# Patient Record
Sex: FEMALE | Race: WHITE
[De-identification: names, ages, dates, MRNs, and addresses within clinical notes are randomized per-mention and may not be internally consistent; named-entity substitution may affect disease eponyms.]

---

## 2018-10-25 ENCOUNTER — TRANSCRIPTION ENCOUNTER (OUTPATIENT)
Age: 43
End: 2018-10-25

## 2019-09-04 ENCOUNTER — TRANSCRIPTION ENCOUNTER (OUTPATIENT)
Age: 44
End: 2019-09-04

## 2019-09-13 ENCOUNTER — TRANSCRIPTION ENCOUNTER (OUTPATIENT)
Age: 44
End: 2019-09-13

## 2021-06-18 ENCOUNTER — TRANSCRIPTION ENCOUNTER (OUTPATIENT)
Age: 46
End: 2021-06-18

## 2023-02-14 ENCOUNTER — NON-APPOINTMENT (OUTPATIENT)
Age: 48
End: 2023-02-14

## 2023-10-30 ENCOUNTER — NON-APPOINTMENT (OUTPATIENT)
Age: 48
End: 2023-10-30

## 2023-12-25 ENCOUNTER — NON-APPOINTMENT (OUTPATIENT)
Age: 48
End: 2023-12-25

## 2024-02-07 ENCOUNTER — NON-APPOINTMENT (OUTPATIENT)
Age: 49
End: 2024-02-07

## 2024-04-20 ENCOUNTER — NON-APPOINTMENT (OUTPATIENT)
Age: 49
End: 2024-04-20

## 2024-05-15 ENCOUNTER — NON-APPOINTMENT (OUTPATIENT)
Age: 49
End: 2024-05-15

## 2024-05-16 PROBLEM — Z00.00 ENCOUNTER FOR PREVENTIVE HEALTH EXAMINATION: Status: ACTIVE | Noted: 2024-05-16

## 2024-05-20 ENCOUNTER — NON-APPOINTMENT (OUTPATIENT)
Age: 49
End: 2024-05-20

## 2024-05-20 ENCOUNTER — APPOINTMENT (OUTPATIENT)
Dept: ORTHOPEDIC SURGERY | Facility: CLINIC | Age: 49
End: 2024-05-20
Payer: COMMERCIAL

## 2024-05-20 VITALS — HEIGHT: 60 IN | BODY MASS INDEX: 28.47 KG/M2 | WEIGHT: 145 LBS

## 2024-05-20 DIAGNOSIS — Z78.9 OTHER SPECIFIED HEALTH STATUS: ICD-10-CM

## 2024-05-20 DIAGNOSIS — E78.00 PURE HYPERCHOLESTEROLEMIA, UNSPECIFIED: ICD-10-CM

## 2024-05-20 PROCEDURE — 29130 APPL FINGER SPLINT STATIC: CPT | Mod: RT

## 2024-05-20 PROCEDURE — 99203 OFFICE O/P NEW LOW 30 MIN: CPT | Mod: 25

## 2024-05-20 RX ORDER — ROSUVASTATIN CALCIUM 5 MG/1
5 TABLET, FILM COATED ORAL
Refills: 0 | Status: ACTIVE | COMMUNITY

## 2024-05-20 RX ORDER — METHYLPREDNISOLONE 4 MG/1
4 TABLET ORAL
Qty: 1 | Refills: 0 | Status: ACTIVE | COMMUNITY
Start: 2024-05-20 | End: 1900-01-01

## 2024-05-20 NOTE — HISTORY OF PRESENT ILLNESS
[Dull/Aching] : dull/aching [Localized] : localized [Sharp] : sharp [Intermittent] : intermittent [Nothing helps with pain getting better] : Nothing helps with pain getting better [Exercising] : exercising [Full time] : Work status: full time [de-identified] : 05/20/2024 :GERALD ALEJO , a 48 year old female, presents today for right thumb pt is unsure of direct trauma.  PMH: hyperlipidemia Allergies: PCN, contrast dye [] : Patient is currently injured and not playing sports: no

## 2024-05-20 NOTE — ASSESSMENT
[FreeTextEntry1] : The patient was advised of the diagnosis. The natural history of the pathology was explained in full to the patient in layman's terms. All questions were answered. The risks and benefits of surgical and non-surgical treatment alternatives were explained in full to the patient.  Pt provided right thumb STACK splint for comfort. MDP provided. RTO in 3-4 weeks for fu care.

## 2024-05-20 NOTE — IMAGING
[de-identified] : right thumb IP joint swelling and ttp  no ligamentous laxity is noted  mild right thumb stiffness all digits are nvi with intact flexor and extensor function nml cascade  Outside 3 view xrays shows Ip joint OA as well as calcific tendinitis.

## 2024-05-30 ENCOUNTER — NON-APPOINTMENT (OUTPATIENT)
Age: 49
End: 2024-05-30

## 2024-06-03 ENCOUNTER — NON-APPOINTMENT (OUTPATIENT)
Age: 49
End: 2024-06-03

## 2024-06-03 ENCOUNTER — APPOINTMENT (OUTPATIENT)
Dept: ORTHOPEDIC SURGERY | Facility: CLINIC | Age: 49
End: 2024-06-03
Payer: COMMERCIAL

## 2024-06-03 DIAGNOSIS — M65.241 CALCIFIC TENDINITIS, RIGHT HAND: ICD-10-CM

## 2024-06-03 PROCEDURE — 99213 OFFICE O/P EST LOW 20 MIN: CPT

## 2024-06-03 NOTE — HISTORY OF PRESENT ILLNESS
[Dull/Aching] : dull/aching [Localized] : localized [Sharp] : sharp [Intermittent] : intermittent [Nothing helps with pain getting better] : Nothing helps with pain getting better [Exercising] : exercising [Full time] : Work status: full time [de-identified] : 06/03/2024: Patient follows up for right thumb pain. She didn't finish MDP due to adverse reaction but has been using splint, and taking motrin TID, reports no improvement in symptoms.   05/20/2024 :GERALD ALEJO , a 48 year old female, presents today for right thumb pt is unsure of direct trauma.  PMH: hyperlipidemia Allergies: PCN, contrast dye [] : Patient is currently injured and not playing sports: no

## 2024-06-03 NOTE — IMAGING
[de-identified] : right thumb IP joint swelling and ttp  no ligamentous laxity is noted  mild right thumb stiffness all digits are nvi with intact flexor and extensor function nml cascade  Outside 3 view xrays shows Ip joint OA as well as calcific tendinitis.

## 2024-06-03 NOTE — ASSESSMENT
[FreeTextEntry1] : The patient was advised of the diagnosis. The natural history of the pathology was explained in full to the patient in layman's terms. All questions were answered. The risks and benefits of surgical and non-surgical treatment alternatives were explained in full to the patient.  Pt declines CSI at this time Naproxen PRN Pt provided right thumb STACK splint for comfort. RTO in 3-4 weeks for fu care.

## 2024-06-05 RX ORDER — IBUPROFEN 600 MG/1
600 TABLET, FILM COATED ORAL
Qty: 40 | Refills: 3 | Status: DISCONTINUED | COMMUNITY
Start: 2024-06-05 | End: 2024-06-05

## 2024-06-05 RX ORDER — NAPROXEN SODIUM 550 MG/1
550 TABLET ORAL
Qty: 60 | Refills: 2 | Status: DISCONTINUED | COMMUNITY
Start: 2024-06-03 | End: 2024-06-05

## 2024-06-05 RX ORDER — IBUPROFEN 800 MG/1
800 TABLET, FILM COATED ORAL
Qty: 40 | Refills: 3 | Status: ACTIVE | COMMUNITY
Start: 2024-06-05 | End: 1900-01-01

## 2024-06-18 ENCOUNTER — APPOINTMENT (OUTPATIENT)
Dept: ORTHOPEDIC SURGERY | Facility: CLINIC | Age: 49
End: 2024-06-18

## 2024-06-21 ENCOUNTER — NON-APPOINTMENT (OUTPATIENT)
Age: 49
End: 2024-06-21

## 2024-09-25 ENCOUNTER — NON-APPOINTMENT (OUTPATIENT)
Age: 49
End: 2024-09-25

## 2025-04-08 ENCOUNTER — APPOINTMENT (OUTPATIENT)
Facility: CLINIC | Age: 50
End: 2025-04-08
Payer: COMMERCIAL

## 2025-04-08 VITALS
HEIGHT: 60 IN | HEART RATE: 86 BPM | WEIGHT: 155 LBS | RESPIRATION RATE: 16 BRPM | SYSTOLIC BLOOD PRESSURE: 137 MMHG | OXYGEN SATURATION: 98 % | BODY MASS INDEX: 30.43 KG/M2 | DIASTOLIC BLOOD PRESSURE: 90 MMHG

## 2025-04-08 DIAGNOSIS — K86.2 CYST OF PANCREAS: ICD-10-CM

## 2025-04-08 DIAGNOSIS — K86.89 OTHER SPECIFIED DISEASES OF PANCREAS: ICD-10-CM

## 2025-04-08 DIAGNOSIS — K80.50 CALCULUS OF BILE DUCT W/OUT CHOLANGITIS OR CHOLECYSTITIS W/OUT OBSTRUCTION: ICD-10-CM

## 2025-04-08 DIAGNOSIS — Z86.39 PERSONAL HISTORY OF OTHER ENDOCRINE, NUTRITIONAL AND METABOLIC DISEASE: ICD-10-CM

## 2025-04-08 DIAGNOSIS — K80.20 CALCULUS OF GALLBLADDER W/OUT CHOLECYSTITIS W/OUT OBSTRUCTION: ICD-10-CM

## 2025-04-08 DIAGNOSIS — Z86.79 PERSONAL HISTORY OF OTHER DISEASES OF THE CIRCULATORY SYSTEM: ICD-10-CM

## 2025-04-08 PROCEDURE — 99204 OFFICE O/P NEW MOD 45 MIN: CPT

## 2025-04-09 ENCOUNTER — NON-APPOINTMENT (OUTPATIENT)
Age: 50
End: 2025-04-09

## 2025-04-22 RX ORDER — ROSUVASTATIN CALCIUM 5 MG/1
1 TABLET, FILM COATED ORAL
Refills: 0 | DISCHARGE

## 2025-04-22 NOTE — ASU PATIENT PROFILE, ADULT - ACCEPTABLE
ABSOLUTELY NO BENDING, STRENUOUS ACTIVITIES, NO RUBBING EYE, LIFTING ANYTHING OVER 10 LBS.    START EYE DROPS WHEN YOU GET HOME TODAY (12NOON, 5PM AND 9PM)    TOMORROW  START TAKING THE DROPS 4 TIMES A DAY (BREAKFAST, LUNCH, DINNER AND BEDTIME)    YOU HAVE 3 EYE DROPS: (BEIGE TOP IS THE ANTIBIOTIC, PINK TOP IS STEROID, GRAY TOP IS USED FOR PAIN)    SHAKE ALL OF THE DROPS PRIOR TO USE..WASH HANDS AND WAIT AT LEAST 2 MINUTES BETWEEN EACH DROP.    WEAR EYE SHIELD AT BEDTIME, WEAR DARK GLASSES WHILE OUT IN WEATHER AND IN BRIGHT LIGHTS.     SLEEP ON OPPOSITE SIDE.     TAKE EYE BAG WITH YOU TO MD APPT IN THE AM    2

## 2025-04-22 NOTE — ASU PATIENT PROFILE, ADULT - NSICDXPASTMEDICALHX_GEN_ALL_CORE_FT
PAST MEDICAL HISTORY:  CAD (coronary artery disease)     History of Hashimoto thyroiditis     HLD (hyperlipidemia)     HTN (hypertension)     
                                  Heavy/irregular periods           HYST                                      Vaginal discharge                   no  Hematological: Bruises easy Denies clotting disorder  yes     Endocrine:  Hot flashes Multiple times per day yes     Hot/Cold Intolerance  no    Psychological:            Mood and affect were within normal limits.        yes                 Physical Exam    Physical Exam:    Vitals:    03/05/25 0959   BP: 120/78   Pulse: 95   Weight: 57.2 kg (126 lb)   Height: 1.549 m (5' 1\")       General Appearance:  This  is a well developed, well nourished, well groomed female.      Her BMI was reviewed. Nutritional decision making andexercise were discussed.    Neurological:  The patient is alert and oriented to time,place, person, and situation    Skin:  A brief inspection of the skin revealed no rashes or lesions.     Neck:  The neck was supple.     Respiratory:  There was unlabored respiratory effort. Lungs clear to ascultation.    Cardiovascular:  The patients extremities were without calf tenderness or edema.Heart with a regular rate and rhythm.    Abdomen:  The abdomen was soft and non-tender with no guarding, rebound or rigidity.  No hernias were appreciated.     Breast:   The patients breasts were symmetrical.  There were no masses, discharge or retractions noted.  Self breast exams were reviewed.    Pelvic Exam:  The external genitalia was with a normal appearance. Hair distribution is sparse.          Urinary System: Urethral meatus normal    The vaginal vault is pale and atrophicl. There were no cystocele, rectocele, or enterocele appreciated. There was yellow to brown vaginal discharge.    The cervix was without lesions. There was no cervical motion tenderness.    The uterus was mobile, midline and regular.    The adnexa no fullness, tenderness or masses appreciated.    Rectal exam: deferred per patient request          ASSESSMENT: Normal annual well woman exam    57

## 2025-04-22 NOTE — ASU PATIENT PROFILE, ADULT - FALL HARM RISK - UNIVERSAL INTERVENTIONS
Bed in lowest position, wheels locked, appropriate side rails in place/Call bell, personal items and telephone in reach/Instruct patient to call for assistance before getting out of bed or chair/Non-slip footwear when patient is out of bed/[place] to call system/Physically safe environment - no spills, clutter or unnecessary equipment/Purposeful Proactive Rounding/Room/bathroom lighting operational, light cord in reach

## 2025-04-23 ENCOUNTER — OUTPATIENT (OUTPATIENT)
Dept: INPATIENT UNIT | Facility: HOSPITAL | Age: 50
LOS: 1 days | Discharge: HOME | End: 2025-04-23
Payer: COMMERCIAL

## 2025-04-23 ENCOUNTER — INPATIENT (INPATIENT)
Facility: HOSPITAL | Age: 50
LOS: 4 days | Discharge: HOME | DRG: 393 | End: 2025-04-28
Attending: STUDENT IN AN ORGANIZED HEALTH CARE EDUCATION/TRAINING PROGRAM | Admitting: STUDENT IN AN ORGANIZED HEALTH CARE EDUCATION/TRAINING PROGRAM
Payer: COMMERCIAL

## 2025-04-23 ENCOUNTER — APPOINTMENT (OUTPATIENT)
Dept: GASTROENTEROLOGY | Facility: HOSPITAL | Age: 50
End: 2025-04-23

## 2025-04-23 ENCOUNTER — RESULT REVIEW (OUTPATIENT)
Age: 50
End: 2025-04-23

## 2025-04-23 ENCOUNTER — TRANSCRIPTION ENCOUNTER (OUTPATIENT)
Age: 50
End: 2025-04-23

## 2025-04-23 VITALS — WEIGHT: 154.98 LBS | HEART RATE: 80 BPM | RESPIRATION RATE: 16 BRPM | OXYGEN SATURATION: 99 % | TEMPERATURE: 97 F

## 2025-04-23 VITALS
OXYGEN SATURATION: 98 % | HEART RATE: 53 BPM | TEMPERATURE: 98 F | DIASTOLIC BLOOD PRESSURE: 84 MMHG | SYSTOLIC BLOOD PRESSURE: 131 MMHG | RESPIRATION RATE: 14 BRPM

## 2025-04-23 VITALS — HEIGHT: 60 IN | WEIGHT: 155.21 LBS

## 2025-04-23 DIAGNOSIS — K86.2 CYST OF PANCREAS: ICD-10-CM

## 2025-04-23 DIAGNOSIS — I25.10 ATHEROSCLEROTIC HEART DISEASE OF NATIVE CORONARY ARTERY WITHOUT ANGINA PECTORIS: ICD-10-CM

## 2025-04-23 DIAGNOSIS — K91.89 OTHER POSTPROCEDURAL COMPLICATIONS AND DISORDERS OF DIGESTIVE SYSTEM: ICD-10-CM

## 2025-04-23 DIAGNOSIS — E87.6 HYPOKALEMIA: ICD-10-CM

## 2025-04-23 DIAGNOSIS — E06.3 AUTOIMMUNE THYROIDITIS: ICD-10-CM

## 2025-04-23 DIAGNOSIS — E78.5 HYPERLIPIDEMIA, UNSPECIFIED: ICD-10-CM

## 2025-04-23 DIAGNOSIS — K76.89 OTHER SPECIFIED DISEASES OF LIVER: ICD-10-CM

## 2025-04-23 DIAGNOSIS — N17.9 ACUTE KIDNEY FAILURE, UNSPECIFIED: ICD-10-CM

## 2025-04-23 DIAGNOSIS — K85.80 OTHER ACUTE PANCREATITIS WITHOUT NECROSIS OR INFECTION: ICD-10-CM

## 2025-04-23 DIAGNOSIS — E78.00 PURE HYPERCHOLESTEROLEMIA, UNSPECIFIED: ICD-10-CM

## 2025-04-23 DIAGNOSIS — Z98.890 OTHER SPECIFIED POSTPROCEDURAL STATES: Chronic | ICD-10-CM

## 2025-04-23 DIAGNOSIS — K80.70 CALCULUS OF GALLBLADDER AND BILE DUCT WITHOUT CHOLECYSTITIS WITHOUT OBSTRUCTION: ICD-10-CM

## 2025-04-23 DIAGNOSIS — Z98.891 HISTORY OF UTERINE SCAR FROM PREVIOUS SURGERY: Chronic | ICD-10-CM

## 2025-04-23 DIAGNOSIS — K76.0 FATTY (CHANGE OF) LIVER, NOT ELSEWHERE CLASSIFIED: ICD-10-CM

## 2025-04-23 DIAGNOSIS — K66.1 HEMOPERITONEUM: ICD-10-CM

## 2025-04-23 DIAGNOSIS — E83.39 OTHER DISORDERS OF PHOSPHORUS METABOLISM: ICD-10-CM

## 2025-04-23 DIAGNOSIS — E03.9 HYPOTHYROIDISM, UNSPECIFIED: ICD-10-CM

## 2025-04-23 DIAGNOSIS — Z91.041 RADIOGRAPHIC DYE ALLERGY STATUS: ICD-10-CM

## 2025-04-23 DIAGNOSIS — Z88.2 ALLERGY STATUS TO SULFONAMIDES: ICD-10-CM

## 2025-04-23 DIAGNOSIS — K22.89 OTHER SPECIFIED DISEASE OF ESOPHAGUS: ICD-10-CM

## 2025-04-23 DIAGNOSIS — T37.3X5A ADVERSE EFFECT OF OTHER ANTIPROTOZOAL DRUGS, INITIAL ENCOUNTER: ICD-10-CM

## 2025-04-23 DIAGNOSIS — R10.11 RIGHT UPPER QUADRANT PAIN: ICD-10-CM

## 2025-04-23 DIAGNOSIS — A41.9 SEPSIS, UNSPECIFIED ORGANISM: ICD-10-CM

## 2025-04-23 DIAGNOSIS — K91.840 POSTPROCEDURAL HEMORRHAGE OF A DIGESTIVE SYSTEM ORGAN OR STRUCTURE FOLLOWING A DIGESTIVE SYSTEM PROCEDURE: ICD-10-CM

## 2025-04-23 DIAGNOSIS — Z88.0 ALLERGY STATUS TO PENICILLIN: ICD-10-CM

## 2025-04-23 DIAGNOSIS — I10 ESSENTIAL (PRIMARY) HYPERTENSION: ICD-10-CM

## 2025-04-23 DIAGNOSIS — Z91.018 ALLERGY TO OTHER FOODS: ICD-10-CM

## 2025-04-23 DIAGNOSIS — T36.8X5A ADVERSE EFFECT OF OTHER SYSTEMIC ANTIBIOTICS, INITIAL ENCOUNTER: ICD-10-CM

## 2025-04-23 DIAGNOSIS — Z79.890 HORMONE REPLACEMENT THERAPY: ICD-10-CM

## 2025-04-23 DIAGNOSIS — K86.89 OTHER SPECIFIED DISEASES OF PANCREAS: ICD-10-CM

## 2025-04-23 DIAGNOSIS — L27.0 GENERALIZED SKIN ERUPTION DUE TO DRUGS AND MEDICAMENTS TAKEN INTERNALLY: ICD-10-CM

## 2025-04-23 DIAGNOSIS — K31.A19 GASTRIC INTESTINAL METAPLASIA WITHOUT DYSPLASIA, UNSPECIFIED SITE: ICD-10-CM

## 2025-04-23 DIAGNOSIS — K80.50 CALCULUS OF BILE DUCT WITHOUT CHOLANGITIS OR CHOLECYSTITIS WITHOUT OBSTRUCTION: ICD-10-CM

## 2025-04-23 DIAGNOSIS — K20.90 ESOPHAGITIS, UNSPECIFIED WITHOUT BLEEDING: ICD-10-CM

## 2025-04-23 DIAGNOSIS — K59.00 CONSTIPATION, UNSPECIFIED: ICD-10-CM

## 2025-04-23 LAB
ALBUMIN SERPL ELPH-MCNC: 3.7 G/DL — SIGNIFICANT CHANGE UP (ref 3.3–5)
ALP SERPL-CCNC: 93 U/L — SIGNIFICANT CHANGE UP (ref 40–120)
ALT FLD-CCNC: 60 U/L — SIGNIFICANT CHANGE UP (ref 12–78)
ANION GAP SERPL CALC-SCNC: 8 MMOL/L — SIGNIFICANT CHANGE UP (ref 5–17)
APTT BLD: 27.2 SEC — SIGNIFICANT CHANGE UP (ref 26.1–36.8)
AST SERPL-CCNC: 62 U/L — HIGH (ref 15–37)
BASOPHILS # BLD AUTO: 0.03 K/UL — SIGNIFICANT CHANGE UP (ref 0–0.2)
BASOPHILS NFR BLD AUTO: 0.2 % — SIGNIFICANT CHANGE UP (ref 0–2)
BILIRUB SERPL-MCNC: 1.1 MG/DL — SIGNIFICANT CHANGE UP (ref 0.2–1.2)
BLD GP AB SCN SERPL QL: SIGNIFICANT CHANGE UP
BUN SERPL-MCNC: 9 MG/DL — SIGNIFICANT CHANGE UP (ref 7–23)
CALCIUM SERPL-MCNC: 9.5 MG/DL — SIGNIFICANT CHANGE UP (ref 8.5–10.1)
CHLORIDE SERPL-SCNC: 110 MMOL/L — HIGH (ref 96–108)
CO2 SERPL-SCNC: 23 MMOL/L — SIGNIFICANT CHANGE UP (ref 22–31)
CREAT SERPL-MCNC: 0.88 MG/DL — SIGNIFICANT CHANGE UP (ref 0.5–1.3)
EGFR: 80 ML/MIN/1.73M2 — SIGNIFICANT CHANGE UP
EGFR: 80 ML/MIN/1.73M2 — SIGNIFICANT CHANGE UP
EOSINOPHIL # BLD AUTO: 0 K/UL — SIGNIFICANT CHANGE UP (ref 0–0.5)
EOSINOPHIL NFR BLD AUTO: 0 % — SIGNIFICANT CHANGE UP (ref 0–6)
GLUCOSE SERPL-MCNC: 146 MG/DL — HIGH (ref 70–99)
HCT VFR BLD CALC: 45.8 % — HIGH (ref 34.5–45)
HGB BLD-MCNC: 15.3 G/DL — SIGNIFICANT CHANGE UP (ref 11.5–15.5)
IMM GRANULOCYTES # BLD AUTO: 0.04 K/UL — SIGNIFICANT CHANGE UP (ref 0–0.07)
IMM GRANULOCYTES NFR BLD AUTO: 0.3 % — SIGNIFICANT CHANGE UP (ref 0–0.9)
INR BLD: 1.04 RATIO — SIGNIFICANT CHANGE UP (ref 0.85–1.16)
LIDOCAIN IGE QN: 3225 U/L — HIGH (ref 13–75)
LYMPHOCYTES # BLD AUTO: 1.22 K/UL — SIGNIFICANT CHANGE UP (ref 1–3.3)
LYMPHOCYTES NFR BLD AUTO: 8.4 % — LOW (ref 13–44)
MCHC RBC-ENTMCNC: 30.2 PG — SIGNIFICANT CHANGE UP (ref 27–34)
MCHC RBC-ENTMCNC: 33.4 G/DL — SIGNIFICANT CHANGE UP (ref 32–36)
MCV RBC AUTO: 90.5 FL — SIGNIFICANT CHANGE UP (ref 80–100)
MONOCYTES # BLD AUTO: 0.48 K/UL — SIGNIFICANT CHANGE UP (ref 0–0.9)
MONOCYTES NFR BLD AUTO: 3.3 % — SIGNIFICANT CHANGE UP (ref 2–14)
NEUTROPHILS # BLD AUTO: 12.78 K/UL — HIGH (ref 1.8–7.4)
NEUTROPHILS NFR BLD AUTO: 87.8 % — HIGH (ref 43–77)
NRBC # BLD AUTO: 0 K/UL — SIGNIFICANT CHANGE UP (ref 0–0)
NRBC # FLD: 0 K/UL — SIGNIFICANT CHANGE UP (ref 0–0)
NRBC BLD AUTO-RTO: 0 /100 WBCS — SIGNIFICANT CHANGE UP (ref 0–0)
PLATELET # BLD AUTO: 264 K/UL — SIGNIFICANT CHANGE UP (ref 150–400)
PMV BLD: 11 FL — SIGNIFICANT CHANGE UP (ref 7–13)
POTASSIUM SERPL-MCNC: 3.5 MMOL/L — SIGNIFICANT CHANGE UP (ref 3.5–5.3)
POTASSIUM SERPL-SCNC: 3.5 MMOL/L — SIGNIFICANT CHANGE UP (ref 3.5–5.3)
PROT SERPL-MCNC: 7.6 GM/DL — SIGNIFICANT CHANGE UP (ref 6–8.3)
PROTHROM AB SERPL-ACNC: 12.3 SEC — SIGNIFICANT CHANGE UP (ref 9.9–13.4)
RBC # BLD: 5.06 M/UL — SIGNIFICANT CHANGE UP (ref 3.8–5.2)
RBC # FLD: 12 % — SIGNIFICANT CHANGE UP (ref 10.3–14.5)
SODIUM SERPL-SCNC: 141 MMOL/L — SIGNIFICANT CHANGE UP (ref 135–145)
WBC # BLD: 14.55 K/UL — HIGH (ref 3.8–10.5)
WBC # FLD AUTO: 14.55 K/UL — HIGH (ref 3.8–10.5)

## 2025-04-23 PROCEDURE — 88305 TISSUE EXAM BY PATHOLOGIST: CPT | Mod: 26

## 2025-04-23 PROCEDURE — 88313 SPECIAL STAINS GROUP 2: CPT

## 2025-04-23 PROCEDURE — 88360 TUMOR IMMUNOHISTOCHEM/MANUAL: CPT

## 2025-04-23 PROCEDURE — 93010 ELECTROCARDIOGRAM REPORT: CPT

## 2025-04-23 PROCEDURE — 88312 SPECIAL STAINS GROUP 1: CPT | Mod: 26

## 2025-04-23 PROCEDURE — 88341 IMHCHEM/IMCYTCHM EA ADD ANTB: CPT

## 2025-04-23 PROCEDURE — 88313 SPECIAL STAINS GROUP 2: CPT | Mod: 26

## 2025-04-23 PROCEDURE — 43238 EGD US FINE NEEDLE BX/ASPIR: CPT

## 2025-04-23 PROCEDURE — 93005 ELECTROCARDIOGRAM TRACING: CPT

## 2025-04-23 PROCEDURE — 88341 IMHCHEM/IMCYTCHM EA ADD ANTB: CPT | Mod: 26

## 2025-04-23 PROCEDURE — 88312 SPECIAL STAINS GROUP 1: CPT

## 2025-04-23 PROCEDURE — 88342 IMHCHEM/IMCYTCHM 1ST ANTB: CPT

## 2025-04-23 PROCEDURE — 88307 TISSUE EXAM BY PATHOLOGIST: CPT | Mod: 26

## 2025-04-23 PROCEDURE — 99285 EMERGENCY DEPT VISIT HI MDM: CPT

## 2025-04-23 PROCEDURE — 88305 TISSUE EXAM BY PATHOLOGIST: CPT

## 2025-04-23 PROCEDURE — 74176 CT ABD & PELVIS W/O CONTRAST: CPT | Mod: 26

## 2025-04-23 PROCEDURE — 88307 TISSUE EXAM BY PATHOLOGIST: CPT

## 2025-04-23 PROCEDURE — 88342 IMHCHEM/IMCYTCHM 1ST ANTB: CPT | Mod: 26

## 2025-04-23 PROCEDURE — 43239 EGD BIOPSY SINGLE/MULTIPLE: CPT | Mod: 59

## 2025-04-23 RX ORDER — FENTANYL CITRATE-0.9 % NACL/PF 100MCG/2ML
25 SYRINGE (ML) INTRAVENOUS
Refills: 0 | Status: DISCONTINUED | OUTPATIENT
Start: 2025-04-23 | End: 2025-04-23

## 2025-04-23 RX ORDER — KETOROLAC TROMETHAMINE 30 MG/ML
30 INJECTION, SOLUTION INTRAMUSCULAR; INTRAVENOUS ONCE
Refills: 0 | Status: DISCONTINUED | OUTPATIENT
Start: 2025-04-23 | End: 2025-04-23

## 2025-04-23 RX ORDER — ONDANSETRON HCL/PF 4 MG/2 ML
4 VIAL (ML) INJECTION ONCE
Refills: 0 | Status: DISCONTINUED | OUTPATIENT
Start: 2025-04-23 | End: 2025-04-23

## 2025-04-23 RX ORDER — ROSUVASTATIN CALCIUM 5 MG/1
1 TABLET, FILM COATED ORAL
Refills: 0 | DISCHARGE

## 2025-04-23 RX ORDER — LEVOTHYROXINE SODIUM 300 MCG
1 TABLET ORAL
Refills: 0 | DISCHARGE

## 2025-04-23 RX ORDER — HYDROMORPHONE/SOD CHLOR,ISO/PF 2 MG/10 ML
1 SYRINGE (ML) INJECTION ONCE
Refills: 0 | Status: DISCONTINUED | OUTPATIENT
Start: 2025-04-23 | End: 2025-04-23

## 2025-04-23 RX ORDER — ONDANSETRON HCL/PF 4 MG/2 ML
4 VIAL (ML) INJECTION ONCE
Refills: 0 | Status: COMPLETED | OUTPATIENT
Start: 2025-04-23 | End: 2025-04-23

## 2025-04-23 RX ORDER — ACETAMINOPHEN 500 MG/5ML
1000 LIQUID (ML) ORAL ONCE
Refills: 0 | Status: COMPLETED | OUTPATIENT
Start: 2025-04-23 | End: 2025-04-23

## 2025-04-23 RX ORDER — HYDROMORPHONE/SOD CHLOR,ISO/PF 2 MG/10 ML
0.5 SYRINGE (ML) INJECTION
Refills: 0 | Status: DISCONTINUED | OUTPATIENT
Start: 2025-04-23 | End: 2025-04-23

## 2025-04-23 RX ORDER — LOSARTAN POTASSIUM 100 MG/1
1 TABLET, FILM COATED ORAL
Refills: 0 | DISCHARGE

## 2025-04-23 RX ADMIN — Medication 1 MILLIGRAM(S): at 22:55

## 2025-04-23 RX ADMIN — Medication 4 MILLIGRAM(S): at 22:25

## 2025-04-23 RX ADMIN — KETOROLAC TROMETHAMINE 30 MILLIGRAM(S): 30 INJECTION, SOLUTION INTRAMUSCULAR; INTRAVENOUS at 11:07

## 2025-04-23 RX ADMIN — Medication 1000 MILLILITER(S): at 22:24

## 2025-04-23 RX ADMIN — Medication 1 MILLIGRAM(S): at 22:29

## 2025-04-23 RX ADMIN — Medication 400 MILLIGRAM(S): at 11:07

## 2025-04-23 NOTE — ASU DISCHARGE PLAN (ADULT/PEDIATRIC) - NS MD DC FALL RISK RISK
For information on Fall & Injury Prevention, visit: [email or web address] OR  [email or web address] OR  https://www.cdc.gov/steadi/patient.html

## 2025-04-23 NOTE — ED PROVIDER NOTE - PHYSICAL EXAMINATION
GEN - NAD; well appearing; A+O x3  HEAD - NC/AT    EYES - EOMI, no conjunctival pallor, no scleral icterus  ENT -   mucous membranes  moist , no discharge  PULM - CTA b/l,  symmetric breath sounds  COR -  RRR, S1 S2, no murmurs  ABD -epigastric tenderness palpation EXTREMS -no edema, no deformity, warm and well perfused   SKIN - no rash or bruising      NEUROLOGIC - alert, sensation nl, motor 5/5 RUE/LUE/RLE/LLE

## 2025-04-23 NOTE — ED PROVIDER NOTE - CLINICAL SUMMARY MEDICAL DECISION MAKING FREE TEXT BOX
Patient with likely pancreatitis in the setting of needle biopsy of pancreas today.  Anaphylaxis to IV contrast.  Patient actively retching cannot give oral contrast.  Will pain control give antiemetics will do Noncon scan to start.

## 2025-04-23 NOTE — ED PROVIDER NOTE - OBJECTIVE STATEMENT
49-year-old female past medical history cholelithiasis pancreatic cyst presents with epigastric abdominal pain and vomiting.  Patient had endoscopy and needle biopsy of pancreas today.  Denies fever or chills.

## 2025-04-23 NOTE — ED ADULT NURSE NOTE - OBJECTIVE STATEMENT
50 y/o female presents to the ED c/o abdominal pain and vomiting starting at 6 pm. Pt endorses that she got an endoscopy done today and got a needle biopsy of the pancreatis. Pt denies pain prior to leaving the endoscopy. Tylenol taken @ 5pm. Respirations even and unlabored, no distress noted.

## 2025-04-23 NOTE — ED ADULT TRIAGE NOTE - CHIEF COMPLAINT QUOTE
Pt ambulatory to ED c/o abdominal pain and vomiting starting approx 6pm. Pt endorses that she got an endoscopy done today and got a needle biopsy of the pancreatis. Pt denies pain prior to leaving the endoscopy. Tylenol taken @5pm. Respirations even and unlabored, no distress noted.

## 2025-04-23 NOTE — ASU DISCHARGE PLAN (ADULT/PEDIATRIC) - FINANCIAL ASSISTANCE
[facility] provides services at a reduced cost to those who are determined to be eligible through [facility]’s financial assistance program. Information regarding [facility]’s financial assistance program can be found by going to [email or web address] or by calling [phone].

## 2025-04-23 NOTE — PHARMACOTHERAPY INTERVENTION NOTE - COMMENTS
Medication reconciliation completed.  Reviewed Medication list and confirmed med allergies with patient; confirmed with Dr. First Medbro.

## 2025-04-24 PROBLEM — I10 ESSENTIAL (PRIMARY) HYPERTENSION: Chronic | Status: ACTIVE | Noted: 2025-04-22

## 2025-04-24 PROBLEM — I25.10 ATHEROSCLEROTIC HEART DISEASE OF NATIVE CORONARY ARTERY WITHOUT ANGINA PECTORIS: Chronic | Status: ACTIVE | Noted: 2025-04-22

## 2025-04-24 PROBLEM — E78.5 HYPERLIPIDEMIA, UNSPECIFIED: Chronic | Status: ACTIVE | Noted: 2025-04-22

## 2025-04-24 LAB
ABO RH CONFIRMATION: SIGNIFICANT CHANGE UP
ADD ON TEST-SPECIMEN IN LAB: SIGNIFICANT CHANGE UP
ALBUMIN SERPL ELPH-MCNC: 3.3 G/DL — SIGNIFICANT CHANGE UP (ref 3.3–5)
ALP SERPL-CCNC: 73 U/L — SIGNIFICANT CHANGE UP (ref 40–120)
ALT FLD-CCNC: 47 U/L — SIGNIFICANT CHANGE UP (ref 12–78)
ANION GAP SERPL CALC-SCNC: 5 MMOL/L — SIGNIFICANT CHANGE UP (ref 5–17)
AST SERPL-CCNC: 26 U/L — SIGNIFICANT CHANGE UP (ref 15–37)
BILIRUB SERPL-MCNC: 0.7 MG/DL — SIGNIFICANT CHANGE UP (ref 0.2–1.2)
BUN SERPL-MCNC: 8 MG/DL — SIGNIFICANT CHANGE UP (ref 7–23)
CALCIUM SERPL-MCNC: 8.9 MG/DL — SIGNIFICANT CHANGE UP (ref 8.5–10.1)
CHLORIDE SERPL-SCNC: 109 MMOL/L — HIGH (ref 96–108)
CO2 SERPL-SCNC: 25 MMOL/L — SIGNIFICANT CHANGE UP (ref 22–31)
CREAT SERPL-MCNC: 0.56 MG/DL — SIGNIFICANT CHANGE UP (ref 0.5–1.3)
EGFR: 110 ML/MIN/1.73M2 — SIGNIFICANT CHANGE UP
EGFR: 110 ML/MIN/1.73M2 — SIGNIFICANT CHANGE UP
GLUCOSE SERPL-MCNC: 90 MG/DL — SIGNIFICANT CHANGE UP (ref 70–99)
HCG SERPL-ACNC: 2 MIU/ML — SIGNIFICANT CHANGE UP
HCT VFR BLD CALC: 44.8 % — SIGNIFICANT CHANGE UP (ref 34.5–45)
HGB BLD-MCNC: 14.3 G/DL — SIGNIFICANT CHANGE UP (ref 11.5–15.5)
LIDOCAIN IGE QN: 1446 U/L — HIGH (ref 13–75)
MCHC RBC-ENTMCNC: 29.5 PG — SIGNIFICANT CHANGE UP (ref 27–34)
MCHC RBC-ENTMCNC: 31.9 G/DL — LOW (ref 32–36)
MCV RBC AUTO: 92.6 FL — SIGNIFICANT CHANGE UP (ref 80–100)
NRBC # BLD AUTO: 0 K/UL — SIGNIFICANT CHANGE UP (ref 0–0)
NRBC # FLD: 0 K/UL — SIGNIFICANT CHANGE UP (ref 0–0)
NRBC BLD AUTO-RTO: 0 /100 WBCS — SIGNIFICANT CHANGE UP (ref 0–0)
PLATELET # BLD AUTO: 222 K/UL — SIGNIFICANT CHANGE UP (ref 150–400)
PMV BLD: 10.8 FL — SIGNIFICANT CHANGE UP (ref 7–13)
POTASSIUM SERPL-MCNC: 3.9 MMOL/L — SIGNIFICANT CHANGE UP (ref 3.5–5.3)
POTASSIUM SERPL-SCNC: 3.9 MMOL/L — SIGNIFICANT CHANGE UP (ref 3.5–5.3)
PROT SERPL-MCNC: 6.8 GM/DL — SIGNIFICANT CHANGE UP (ref 6–8.3)
RBC # BLD: 4.84 M/UL — SIGNIFICANT CHANGE UP (ref 3.8–5.2)
RBC # FLD: 12.2 % — SIGNIFICANT CHANGE UP (ref 10.3–14.5)
SODIUM SERPL-SCNC: 139 MMOL/L — SIGNIFICANT CHANGE UP (ref 135–145)
WBC # BLD: 14.43 K/UL — HIGH (ref 3.8–10.5)
WBC # FLD AUTO: 14.43 K/UL — HIGH (ref 3.8–10.5)

## 2025-04-24 PROCEDURE — 71045 X-RAY EXAM CHEST 1 VIEW: CPT | Mod: 26

## 2025-04-24 PROCEDURE — 84145 PROCALCITONIN (PCT): CPT

## 2025-04-24 PROCEDURE — 36415 COLL VENOUS BLD VENIPUNCTURE: CPT

## 2025-04-24 PROCEDURE — 84100 ASSAY OF PHOSPHORUS: CPT

## 2025-04-24 PROCEDURE — 82150 ASSAY OF AMYLASE: CPT

## 2025-04-24 PROCEDURE — 85027 COMPLETE CBC AUTOMATED: CPT

## 2025-04-24 PROCEDURE — 84702 CHORIONIC GONADOTROPIN TEST: CPT

## 2025-04-24 PROCEDURE — 82248 BILIRUBIN DIRECT: CPT

## 2025-04-24 PROCEDURE — 83735 ASSAY OF MAGNESIUM: CPT

## 2025-04-24 PROCEDURE — 71045 X-RAY EXAM CHEST 1 VIEW: CPT

## 2025-04-24 PROCEDURE — 99232 SBSQ HOSP IP/OBS MODERATE 35: CPT

## 2025-04-24 PROCEDURE — 80053 COMPREHEN METABOLIC PANEL: CPT

## 2025-04-24 PROCEDURE — 83690 ASSAY OF LIPASE: CPT

## 2025-04-24 PROCEDURE — 99222 1ST HOSP IP/OBS MODERATE 55: CPT

## 2025-04-24 PROCEDURE — 82962 GLUCOSE BLOOD TEST: CPT

## 2025-04-24 PROCEDURE — 85025 COMPLETE CBC W/AUTO DIFF WBC: CPT

## 2025-04-24 PROCEDURE — 86140 C-REACTIVE PROTEIN: CPT

## 2025-04-24 PROCEDURE — 84443 ASSAY THYROID STIM HORMONE: CPT

## 2025-04-24 RX ORDER — ACETAMINOPHEN 500 MG/5ML
1000 LIQUID (ML) ORAL ONCE
Refills: 0 | Status: COMPLETED | OUTPATIENT
Start: 2025-04-24 | End: 2025-04-24

## 2025-04-24 RX ORDER — CIPROFLOXACIN HCL 250 MG
TABLET ORAL
Refills: 0 | Status: COMPLETED | OUTPATIENT
Start: 2025-04-24 | End: 2025-04-24

## 2025-04-24 RX ORDER — HYDROMORPHONE/SOD CHLOR,ISO/PF 2 MG/10 ML
0.5 SYRINGE (ML) INJECTION ONCE
Refills: 0 | Status: DISCONTINUED | OUTPATIENT
Start: 2025-04-24 | End: 2025-04-24

## 2025-04-24 RX ORDER — ROSUVASTATIN CALCIUM 20 MG/1
1 TABLET, FILM COATED ORAL
Refills: 0 | DISCHARGE

## 2025-04-24 RX ORDER — DIPHENHYDRAMINE HCL 12.5MG/5ML
25 ELIXIR ORAL EVERY 6 HOURS
Refills: 0 | Status: DISCONTINUED | OUTPATIENT
Start: 2025-04-24 | End: 2025-04-28

## 2025-04-24 RX ORDER — CIPROFLOXACIN HCL 250 MG
400 TABLET ORAL EVERY 8 HOURS
Refills: 0 | Status: COMPLETED | OUTPATIENT
Start: 2025-04-24 | End: 2025-04-24

## 2025-04-24 RX ORDER — METRONIDAZOLE 250 MG
TABLET ORAL
Refills: 0 | Status: COMPLETED | OUTPATIENT
Start: 2025-04-24 | End: 2025-04-24

## 2025-04-24 RX ORDER — ONDANSETRON HCL/PF 4 MG/2 ML
4 VIAL (ML) INJECTION EVERY 6 HOURS
Refills: 0 | Status: DISCONTINUED | OUTPATIENT
Start: 2025-04-24 | End: 2025-04-27

## 2025-04-24 RX ORDER — SODIUM CHLORIDE 9 G/1000ML
1000 INJECTION, SOLUTION INTRAVENOUS
Refills: 0 | Status: DISCONTINUED | OUTPATIENT
Start: 2025-04-24 | End: 2025-04-25

## 2025-04-24 RX ORDER — BISACODYL 5 MG
10 TABLET, DELAYED RELEASE (ENTERIC COATED) ORAL ONCE
Refills: 0 | Status: COMPLETED | OUTPATIENT
Start: 2025-04-24 | End: 2025-04-24

## 2025-04-24 RX ORDER — METRONIDAZOLE 250 MG
500 TABLET ORAL EVERY 8 HOURS
Refills: 0 | Status: COMPLETED | OUTPATIENT
Start: 2025-04-24 | End: 2025-04-24

## 2025-04-24 RX ORDER — METRONIDAZOLE 250 MG
500 TABLET ORAL ONCE
Refills: 0 | Status: COMPLETED | OUTPATIENT
Start: 2025-04-24 | End: 2025-04-24

## 2025-04-24 RX ORDER — ACETAMINOPHEN 500 MG/5ML
650 LIQUID (ML) ORAL EVERY 6 HOURS
Refills: 0 | Status: DISCONTINUED | OUTPATIENT
Start: 2025-04-24 | End: 2025-04-28

## 2025-04-24 RX ORDER — LOSARTAN POTASSIUM 100 MG/1
25 TABLET, FILM COATED ORAL DAILY
Refills: 0 | Status: DISCONTINUED | OUTPATIENT
Start: 2025-04-24 | End: 2025-04-28

## 2025-04-24 RX ORDER — CIPROFLOXACIN HCL 250 MG
400 TABLET ORAL ONCE
Refills: 0 | Status: COMPLETED | OUTPATIENT
Start: 2025-04-24 | End: 2025-04-24

## 2025-04-24 RX ORDER — SENNA 187 MG
2 TABLET ORAL AT BEDTIME
Refills: 0 | Status: DISCONTINUED | OUTPATIENT
Start: 2025-04-24 | End: 2025-04-28

## 2025-04-24 RX ORDER — ONDANSETRON HCL/PF 4 MG/2 ML
4 VIAL (ML) INJECTION EVERY 4 HOURS
Refills: 0 | Status: DISCONTINUED | OUTPATIENT
Start: 2025-04-24 | End: 2025-04-24

## 2025-04-24 RX ORDER — HYDROMORPHONE/SOD CHLOR,ISO/PF 2 MG/10 ML
1 SYRINGE (ML) INJECTION EVERY 4 HOURS
Refills: 0 | Status: DISCONTINUED | OUTPATIENT
Start: 2025-04-24 | End: 2025-04-25

## 2025-04-24 RX ORDER — HYDROMORPHONE/SOD CHLOR,ISO/PF 2 MG/10 ML
1 SYRINGE (ML) INJECTION ONCE
Refills: 0 | Status: DISCONTINUED | OUTPATIENT
Start: 2025-04-24 | End: 2025-04-24

## 2025-04-24 RX ORDER — LACTOBACILLUS ACIDOPHILUS/PECT 75 MM-100
1 CAPSULE ORAL DAILY
Refills: 0 | Status: DISCONTINUED | OUTPATIENT
Start: 2025-04-24 | End: 2025-04-28

## 2025-04-24 RX ORDER — METHYLPREDNISOLONE ACETATE 80 MG/ML
125 INJECTION, SUSPENSION INTRA-ARTICULAR; INTRALESIONAL; INTRAMUSCULAR; SOFT TISSUE ONCE
Refills: 0 | Status: COMPLETED | OUTPATIENT
Start: 2025-04-24 | End: 2025-04-24

## 2025-04-24 RX ORDER — LEVOTHYROXINE SODIUM 300 MCG
88 TABLET ORAL DAILY
Refills: 0 | Status: DISCONTINUED | OUTPATIENT
Start: 2025-04-24 | End: 2025-04-28

## 2025-04-24 RX ADMIN — Medication 88 MICROGRAM(S): at 07:42

## 2025-04-24 RX ADMIN — Medication 25 MILLIGRAM(S): at 17:53

## 2025-04-24 RX ADMIN — SODIUM CHLORIDE 200 MILLILITER(S): 9 INJECTION, SOLUTION INTRAVENOUS at 11:27

## 2025-04-24 RX ADMIN — Medication 4 MILLIGRAM(S): at 11:44

## 2025-04-24 RX ADMIN — Medication 650 MILLIGRAM(S): at 22:50

## 2025-04-24 RX ADMIN — Medication 1 MILLIGRAM(S): at 07:42

## 2025-04-24 RX ADMIN — SODIUM CHLORIDE 200 MILLILITER(S): 9 INJECTION, SOLUTION INTRAVENOUS at 22:12

## 2025-04-24 RX ADMIN — Medication 100 MILLIGRAM(S): at 02:45

## 2025-04-24 RX ADMIN — Medication 400 MILLIGRAM(S): at 01:02

## 2025-04-24 RX ADMIN — Medication 100 MILLIGRAM(S): at 13:55

## 2025-04-24 RX ADMIN — Medication 0.5 MILLIGRAM(S): at 03:27

## 2025-04-24 RX ADMIN — SODIUM CHLORIDE 200 MILLILITER(S): 9 INJECTION, SOLUTION INTRAVENOUS at 17:08

## 2025-04-24 RX ADMIN — Medication 200 MILLIGRAM(S): at 01:29

## 2025-04-24 RX ADMIN — Medication 200 MILLILITER(S): at 06:43

## 2025-04-24 RX ADMIN — Medication 650 MILLIGRAM(S): at 11:44

## 2025-04-24 RX ADMIN — Medication 650 MILLIGRAM(S): at 06:43

## 2025-04-24 RX ADMIN — Medication 25 MILLIGRAM(S): at 23:59

## 2025-04-24 RX ADMIN — Medication 650 MILLIGRAM(S): at 17:53

## 2025-04-24 RX ADMIN — Medication 1 MILLIGRAM(S): at 15:16

## 2025-04-24 RX ADMIN — Medication 10 MILLIGRAM(S): at 15:15

## 2025-04-24 RX ADMIN — Medication 1 MILLIGRAM(S): at 23:37

## 2025-04-24 RX ADMIN — LOSARTAN POTASSIUM 25 MILLIGRAM(S): 100 TABLET, FILM COATED ORAL at 11:21

## 2025-04-24 RX ADMIN — Medication 200 MILLIGRAM(S): at 11:22

## 2025-04-24 NOTE — PROGRESS NOTE ADULT - SUBJECTIVE AND OBJECTIVE BOX
CC:Patient is a 49y old  Female who presents with a chief complaint of pancreatitis (24 Apr 2025 07:53)      Subjective:  Pt seen and examined at bedside with chaperone. Pt is AAOx3, pt in no acute distress. Pt denied c/o fever, chills, chest pain, SOB, N/V/D, extremity pain or dysfunction, hemoptysis, hematemesis, hematuria, hematochexia, headache, diplopia, vertigo, dizzyness. Pt tolerating diet, (+) void, (+) ambulation, (+) bowel function    ROS:  otherwise as abovementioned ROS    Vital Signs Last 24 Hrs  T(C): 37 (24 Apr 2025 11:37), Max: 37 (24 Apr 2025 11:37)  T(F): 98.6 (24 Apr 2025 11:37), Max: 98.6 (24 Apr 2025 11:37)  HR: 82 (24 Apr 2025 11:37) (53 - 89)  BP: 136/70 (24 Apr 2025 11:37) (130/66 - 157/89)  BP(mean): 87 (24 Apr 2025 11:37) (87 - 105)  RR: 18 (24 Apr 2025 11:37) (14 - 19)  SpO2: 96% (24 Apr 2025 11:37) (96% - 98%)    Parameters below as of 24 Apr 2025 11:37  Patient On (Oxygen Delivery Method): room air        Labs:                       14.3   14.43 )-----------( 222      ( 24 Apr 2025 06:57 )             44.8     CBC Full  -  ( 24 Apr 2025 06:57 )  WBC Count : 14.43 K/uL  RBC Count : 4.84 M/uL  Hemoglobin : 14.3 g/dL  Hematocrit : 44.8 %  Platelet Count - Automated : 222 K/uL  Mean Cell Volume : 92.6 fl  Mean Cell Hemoglobin : 29.5 pg  Mean Cell Hemoglobin Concentration : 31.9 g/dL  Auto Neutrophil # : x  Auto Lymphocyte # : x  Auto Monocyte # : x  Auto Eosinophil # : x  Auto Basophil # : x  Auto Neutrophil % : x  Auto Lymphocyte % : x  Auto Monocyte % : x  Auto Eosinophil % : x  Auto Basophil % : x    04-24    139  |  109[H]  |  8   ----------------------------<  90  3.9   |  25  |  0.56    Ca    8.9      24 Apr 2025 06:57    TPro  6.8  /  Alb  3.3  /  TBili  0.7  /  DBili  x   /  AST  26  /  ALT  47  /  AlkPhos  73  04-24    LIVER FUNCTIONS - ( 24 Apr 2025 06:57 )  Alb: 3.3 g/dL / Pro: 6.8 gm/dL / ALK PHOS: 73 U/L / ALT: 47 U/L / AST: 26 U/L / GGT: x           PT/INR - ( 23 Apr 2025 22:15 )   PT: 12.3 sec;   INR: 1.04 ratio         PTT - ( 23 Apr 2025 22:15 )  PTT:27.2 sec      Meds:  acetaminophen     Tablet .. 650 milliGRAM(s) Oral every 6 hours  HYDROmorphone  Injectable 1 milliGRAM(s) IV Push every 4 hours PRN  lactated ringers. 1000 milliLiter(s) IV Continuous <Continuous>  levothyroxine 88 MICROGram(s) Oral daily  losartan 25 milliGRAM(s) Oral daily  metroNIDAZOLE  IVPB      metroNIDAZOLE  IVPB 500 milliGRAM(s) IV Intermittent every 8 hours  ondansetron Injectable 4 milliGRAM(s) IV Push every 6 hours PRN  senna 2 Tablet(s) Oral at bedtime PRN      Radiology:    CT Abdomen and Pelvis No Cont (04.23.25 @ 23:43)  IMPRESSION:  Exam limited by noncontrast technique.  Peripancreatic fluid and stranding compatible with pancreatitis. Small to   moderate volume hemorrhage noted anterior to the pancreas, between the   pancreas and stomach and extending to the pericholecystic region. Small   volume hemoperitoneum also noted in the pelvis.    Partially imaged small groundglass opacities in the lingula are   nonspecific, could be infectious or inflammatory in etiology.    Findings were discussed with Dr. BREN ANDERSON [id number] 4/24/2025 12:46   AM by Dr. Corrales with read back confirmation.        Physical exam:  Pt is aaox3  Pt in no acute distress  Psych: normal affect  Resp: CTAB  CVS: S1S2(+)  ABD: mild llq ttp, bowel sounds (+), soft, non distended, no rebound, no guarding, no rigidity, no skin changes to exam.   EXT: no calf tenderness or edema to exam b/l, on VTE prophylaxis  Skin: no adverse skin changes to exam       CC:Patient is a 49y old  Female who presents with a chief complaint of pancreatitis (24 Apr 2025 07:53)      Subjective:  Pt seen and examined at bedside with chaperone. Pt is AAOx3, pt in no acute distress. Pt denied c/o fever, chills, chest pain, SOB, N/V/D, extremity pain or dysfunction, hemoptysis, hematemesis, hematuria, hematochezia, headache, diplopia, vertigo, dizziness. Pt tolerating diet, (+) void, (+) ambulation, (+) bowel function    ROS:  otherwise as abovementioned ROS    Vital Signs Last 24 Hrs  T(C): 37 (24 Apr 2025 11:37), Max: 37 (24 Apr 2025 11:37)  T(F): 98.6 (24 Apr 2025 11:37), Max: 98.6 (24 Apr 2025 11:37)  HR: 82 (24 Apr 2025 11:37) (53 - 89)  BP: 136/70 (24 Apr 2025 11:37) (130/66 - 157/89)  BP(mean): 87 (24 Apr 2025 11:37) (87 - 105)  RR: 18 (24 Apr 2025 11:37) (14 - 19)  SpO2: 96% (24 Apr 2025 11:37) (96% - 98%)    Parameters below as of 24 Apr 2025 11:37  Patient On (Oxygen Delivery Method): room air        Labs:                       14.3   14.43 )-----------( 222      ( 24 Apr 2025 06:57 )             44.8     CBC Full  -  ( 24 Apr 2025 06:57 )  WBC Count : 14.43 K/uL  RBC Count : 4.84 M/uL  Hemoglobin : 14.3 g/dL  Hematocrit : 44.8 %  Platelet Count - Automated : 222 K/uL  Mean Cell Volume : 92.6 fl  Mean Cell Hemoglobin : 29.5 pg  Mean Cell Hemoglobin Concentration : 31.9 g/dL  Auto Neutrophil # : x  Auto Lymphocyte # : x  Auto Monocyte # : x  Auto Eosinophil # : x  Auto Basophil # : x  Auto Neutrophil % : x  Auto Lymphocyte % : x  Auto Monocyte % : x  Auto Eosinophil % : x  Auto Basophil % : x    04-24    139  |  109[H]  |  8   ----------------------------<  90  3.9   |  25  |  0.56    Ca    8.9      24 Apr 2025 06:57    TPro  6.8  /  Alb  3.3  /  TBili  0.7  /  DBili  x   /  AST  26  /  ALT  47  /  AlkPhos  73  04-24    LIVER FUNCTIONS - ( 24 Apr 2025 06:57 )  Alb: 3.3 g/dL / Pro: 6.8 gm/dL / ALK PHOS: 73 U/L / ALT: 47 U/L / AST: 26 U/L / GGT: x           PT/INR - ( 23 Apr 2025 22:15 )   PT: 12.3 sec;   INR: 1.04 ratio         PTT - ( 23 Apr 2025 22:15 )  PTT:27.2 sec      Meds:  acetaminophen     Tablet .. 650 milliGRAM(s) Oral every 6 hours  HYDROmorphone  Injectable 1 milliGRAM(s) IV Push every 4 hours PRN  lactated ringers. 1000 milliLiter(s) IV Continuous <Continuous>  levothyroxine 88 MICROGram(s) Oral daily  losartan 25 milliGRAM(s) Oral daily  metroNIDAZOLE  IVPB      metroNIDAZOLE  IVPB 500 milliGRAM(s) IV Intermittent every 8 hours  ondansetron Injectable 4 milliGRAM(s) IV Push every 6 hours PRN  senna 2 Tablet(s) Oral at bedtime PRN      Radiology:    CT Abdomen and Pelvis No Cont (04.23.25 @ 23:43)  IMPRESSION:  Exam limited by noncontrast technique.  Peripancreatic fluid and stranding compatible with pancreatitis. Small to   moderate volume hemorrhage noted anterior to the pancreas, between the   pancreas and stomach and extending to the pericholecystic region. Small   volume hemoperitoneum also noted in the pelvis.    Partially imaged small groundglass opacities in the lingula are   nonspecific, could be infectious or inflammatory in etiology.    Findings were discussed with Dr. BREN ANDERSON [id number] 4/24/2025 12:46   AM by Dr. Corrales with read back confirmation.        Physical exam:  Pt is aaox3  Pt in no acute distress  Psych: normal affect  Resp: CTAB  CVS: S1S2(+)  ABD: mild llq ttp, bowel sounds (+), soft, non distended, no rebound, no guarding, no rigidity, no skin changes to exam.   EXT: no calf tenderness or edema to exam b/l, on VTE prophylaxis  Skin: no adverse skin changes to exam

## 2025-04-24 NOTE — ED ADULT NURSE REASSESSMENT NOTE - NS ED NURSE REASSESS COMMENT FT1
Assumed care of pt from KELLEY Frankel at 0700. Pt resting comfortably in stretcher, respirations even and unlabored. Pt reports improvement in pain, denies nausea. Aware of POC, waiting for bed on inpatient unit.

## 2025-04-24 NOTE — H&P ADULT - ATTENDING COMMENTS
49-year-old female s/p pancreatic biopsy via EUS with pancreatitis.  Abdomen is soft, minimally tender    CT abd:   Exam limited by noncontrast technique.  Peripancreatic fluid and stranding compatible with pancreatitis. Small to   moderate volume hemorrhage noted anterior to the pancreas, between the   pancreas and stomach and extending to the pericholecystic region. Small   volume hemoperitoneum also noted in the pelvis.    No need of surgical intervention  Admit to surgery for management of pancreatitis  FU GI reccs  serial abdominal exams  IVF  NPO except meds  Pain control  Mechanical DVT ppx

## 2025-04-24 NOTE — H&P ADULT - ASSESSMENT
49-year-old female s/p pancreatic biopsy via EUS with pancreatitis.  Abdomen is soft, minimally tender    CT abd:   Exam limited by noncontrast technique.  Peripancreatic fluid and stranding compatible with pancreatitis. Small to   moderate volume hemorrhage noted anterior to the pancreas, between the   pancreas and stomach and extending to the pericholecystic region. Small   volume hemoperitoneum also noted in the pelvis.    No need of surgical intervention  Admit to surgery for observation and management of pancreatitis, medicine not accepting  FU GI reccs  serial abdominal exams  IVF  NPO except meds  Pain control  Mechanical DVT ppx      Case discussed with Dr. Hughes    49-year-old female s/p pancreatic biopsy via EUS with pancreatitis.  Abdomen is soft, minimally tender    CT abd:   Exam limited by noncontrast technique.  Peripancreatic fluid and stranding compatible with pancreatitis. Small to   moderate volume hemorrhage noted anterior to the pancreas, between the   pancreas and stomach and extending to the pericholecystic region. Small   volume hemoperitoneum also noted in the pelvis.    No need of surgical intervention  Admit to surgery for management of pancreatitis  FU GI reccs  serial abdominal exams  IVF  NPO except meds  Pain control  Mechanical DVT ppx      Case discussed with Dr. Hughes

## 2025-04-24 NOTE — PROGRESS NOTE ADULT - ASSESSMENT
49-year-old female s/p pancreatic biopsy via EUS with pancreatitis.    Plan:  No need of surgical intervention  Admitted to surgery for observation and management of pancreatitis, medicine not accepting  FU GI reccs  C/w IV abx  Serial abdominal exams  IVF  Advance today to FLD  Pain control  Mechanical DVT ppx      Seen and discussed with Dr. Hughes

## 2025-04-24 NOTE — PROGRESS NOTE ADULT - NS ATTEND AMEND GEN_ALL_CORE FT
49-year-old female s/p pancreatic biopsy via EUS with pancreatitis.    Plan:  No need of surgical intervention  Admitted to surgery for management of pancreatitis  FU GI reccs  Stop IV abx  Serial abdominal exams  Advance today to FLD  DC IVF if tolerated  Pain control  Mechanical DVT ppx

## 2025-04-24 NOTE — H&P ADULT - NSICDXPASTMEDICALHX_GEN_ALL_CORE_FT
PAST MEDICAL HISTORY:  CAD (coronary artery disease)     History of Hashimoto thyroiditis     HLD (hyperlipidemia)     HTN (hypertension)

## 2025-04-24 NOTE — H&P ADULT - NSHPREVIEWOFSYSTEMS_GEN_ALL_CORE
49-year-old female past medical history cholelithiasis with later incidental pancreatic cyst that MRI suggested it can be a neuroendocrine tumor for which she underwent pancreatic biopsy via EUS today. She states in the afternoon she experienced abdominal pain and vomiting. Denies fever or chills, SOB, chest pain.

## 2025-04-24 NOTE — H&P ADULT - NSHPPHYSICALEXAM_GEN_ALL_CORE
Vitals:  T(C): 36.9 (04-23 @ 20:58), Max: 36.9 (04-23 @ 20:58)  HR: 89 (04-23 @ 20:58) (89 - 89)  BP: 157/89 (04-23 @ 20:58) (157/89 - 157/89)  RR: 19 (04-23 @ 20:58) (19 - 19)  SpO2: 98% (04-23 @ 20:58) (98% - 98%)      Physical Exam:  General: AAOx3, Well developed, NAD  Chest: Normal respiratory effort  Heart: RRR  Abdomen: Soft, NTND,minimal discomfort with deep palpation  Neuro/Psych: No localized deficits. Normal speech, normal tone  Skin: Normal, no rashes, no lesions noted.   Extremities: Warm, well perfused, no edema, Pulses intact

## 2025-04-24 NOTE — ED ADULT NURSE REASSESSMENT NOTE - NS ED NURSE REASSESS COMMENT FT1
report received from Gavi BENSON. pt resting in stretcher comfortably, medicated for pain per MAR. Pt updated on plan of care, awaiting admission bed. No other verbal complaints at this time

## 2025-04-25 PROBLEM — Z86.39 PERSONAL HISTORY OF OTHER ENDOCRINE, NUTRITIONAL AND METABOLIC DISEASE: Chronic | Status: ACTIVE | Noted: 2025-04-22

## 2025-04-25 LAB
ADD ON TEST-SPECIMEN IN LAB: SIGNIFICANT CHANGE UP
ALBUMIN SERPL ELPH-MCNC: 2.8 G/DL — LOW (ref 3.3–5)
ALP SERPL-CCNC: 66 U/L — SIGNIFICANT CHANGE UP (ref 40–120)
ALT FLD-CCNC: 30 U/L — SIGNIFICANT CHANGE UP (ref 12–78)
ANION GAP SERPL CALC-SCNC: 6 MMOL/L — SIGNIFICANT CHANGE UP (ref 5–17)
AST SERPL-CCNC: 18 U/L — SIGNIFICANT CHANGE UP (ref 15–37)
BILIRUB SERPL-MCNC: 0.9 MG/DL — SIGNIFICANT CHANGE UP (ref 0.2–1.2)
BUN SERPL-MCNC: 7 MG/DL — SIGNIFICANT CHANGE UP (ref 7–23)
CALCIUM SERPL-MCNC: 9.1 MG/DL — SIGNIFICANT CHANGE UP (ref 8.5–10.1)
CHLORIDE SERPL-SCNC: 107 MMOL/L — SIGNIFICANT CHANGE UP (ref 96–108)
CO2 SERPL-SCNC: 26 MMOL/L — SIGNIFICANT CHANGE UP (ref 22–31)
CREAT SERPL-MCNC: 0.61 MG/DL — SIGNIFICANT CHANGE UP (ref 0.5–1.3)
EGFR: 108 ML/MIN/1.73M2 — SIGNIFICANT CHANGE UP
EGFR: 108 ML/MIN/1.73M2 — SIGNIFICANT CHANGE UP
GLUCOSE SERPL-MCNC: 85 MG/DL — SIGNIFICANT CHANGE UP (ref 70–99)
HCT VFR BLD CALC: 41 % — SIGNIFICANT CHANGE UP (ref 34.5–45)
HGB BLD-MCNC: 13.2 G/DL — SIGNIFICANT CHANGE UP (ref 11.5–15.5)
LIDOCAIN IGE QN: 477 U/L — HIGH (ref 13–75)
MAGNESIUM SERPL-MCNC: 1.7 MG/DL — SIGNIFICANT CHANGE UP (ref 1.6–2.6)
MCHC RBC-ENTMCNC: 30.3 PG — SIGNIFICANT CHANGE UP (ref 27–34)
MCHC RBC-ENTMCNC: 32.2 G/DL — SIGNIFICANT CHANGE UP (ref 32–36)
MCV RBC AUTO: 94 FL — SIGNIFICANT CHANGE UP (ref 80–100)
NRBC # BLD AUTO: 0 K/UL — SIGNIFICANT CHANGE UP (ref 0–0)
NRBC # FLD: 0 K/UL — SIGNIFICANT CHANGE UP (ref 0–0)
NRBC BLD AUTO-RTO: 0 /100 WBCS — SIGNIFICANT CHANGE UP (ref 0–0)
PHOSPHATE SERPL-MCNC: 1.9 MG/DL — LOW (ref 2.5–4.5)
PLATELET # BLD AUTO: 188 K/UL — SIGNIFICANT CHANGE UP (ref 150–400)
PMV BLD: 11.2 FL — SIGNIFICANT CHANGE UP (ref 7–13)
POTASSIUM SERPL-MCNC: 3.2 MMOL/L — LOW (ref 3.5–5.3)
POTASSIUM SERPL-SCNC: 3.2 MMOL/L — LOW (ref 3.5–5.3)
PROT SERPL-MCNC: 6.2 GM/DL — SIGNIFICANT CHANGE UP (ref 6–8.3)
RBC # BLD: 4.36 M/UL — SIGNIFICANT CHANGE UP (ref 3.8–5.2)
RBC # FLD: 12.4 % — SIGNIFICANT CHANGE UP (ref 10.3–14.5)
SODIUM SERPL-SCNC: 139 MMOL/L — SIGNIFICANT CHANGE UP (ref 135–145)
SURGICAL PATHOLOGY STUDY: SIGNIFICANT CHANGE UP
WBC # BLD: 19.81 K/UL — HIGH (ref 3.8–10.5)
WBC # FLD AUTO: 19.81 K/UL — HIGH (ref 3.8–10.5)

## 2025-04-25 PROCEDURE — 99232 SBSQ HOSP IP/OBS MODERATE 35: CPT

## 2025-04-25 PROCEDURE — 99497 ADVNCD CARE PLAN 30 MIN: CPT | Mod: 25

## 2025-04-25 PROCEDURE — 99221 1ST HOSP IP/OBS SF/LOW 40: CPT

## 2025-04-25 RX ORDER — DIPHENHYDRAMINE HCL 12.5MG/5ML
25 ELIXIR ORAL ONCE
Refills: 0 | Status: DISCONTINUED | OUTPATIENT
Start: 2025-04-25 | End: 2025-04-25

## 2025-04-25 RX ORDER — SOD PHOS DI, MONO/K PHOS MONO 250 MG
1 TABLET ORAL ONCE
Refills: 0 | Status: COMPLETED | OUTPATIENT
Start: 2025-04-25 | End: 2025-04-25

## 2025-04-25 RX ORDER — BISACODYL 5 MG
10 TABLET, DELAYED RELEASE (ENTERIC COATED) ORAL ONCE
Refills: 0 | Status: COMPLETED | OUTPATIENT
Start: 2025-04-25 | End: 2025-04-25

## 2025-04-25 RX ORDER — OXYCODONE HYDROCHLORIDE 30 MG/1
5 TABLET ORAL EVERY 4 HOURS
Refills: 0 | Status: DISCONTINUED | OUTPATIENT
Start: 2025-04-25 | End: 2025-04-28

## 2025-04-25 RX ORDER — ACETAMINOPHEN 500 MG/5ML
1000 LIQUID (ML) ORAL ONCE
Refills: 0 | Status: DISCONTINUED | OUTPATIENT
Start: 2025-04-25 | End: 2025-04-28

## 2025-04-25 RX ORDER — SIMETHICONE 80 MG
80 TABLET,CHEWABLE ORAL
Refills: 0 | Status: DISCONTINUED | OUTPATIENT
Start: 2025-04-25 | End: 2025-04-25

## 2025-04-25 RX ORDER — HYDROCORTISONE 10 MG/G
1 CREAM TOPICAL
Refills: 0 | Status: DISCONTINUED | OUTPATIENT
Start: 2025-04-25 | End: 2025-04-28

## 2025-04-25 RX ORDER — DIPHENHYDRAMINE HCL 12.5MG/5ML
25 ELIXIR ORAL ONCE
Refills: 0 | Status: COMPLETED | OUTPATIENT
Start: 2025-04-25 | End: 2025-04-25

## 2025-04-25 RX ORDER — TRAMADOL HYDROCHLORIDE 50 MG/1
25 TABLET, FILM COATED ORAL EVERY 6 HOURS
Refills: 0 | Status: DISCONTINUED | OUTPATIENT
Start: 2025-04-25 | End: 2025-04-25

## 2025-04-25 RX ORDER — OXYCODONE HYDROCHLORIDE 30 MG/1
2.5 TABLET ORAL EVERY 4 HOURS
Refills: 0 | Status: DISCONTINUED | OUTPATIENT
Start: 2025-04-25 | End: 2025-04-28

## 2025-04-25 RX ORDER — DIPHENHYDRAMINE HCL 12.5MG/5ML
50 ELIXIR ORAL ONCE
Refills: 0 | Status: DISCONTINUED | OUTPATIENT
Start: 2025-04-25 | End: 2025-04-25

## 2025-04-25 RX ORDER — SIMETHICONE 80 MG
80 TABLET,CHEWABLE ORAL
Refills: 0 | Status: DISCONTINUED | OUTPATIENT
Start: 2025-04-25 | End: 2025-04-28

## 2025-04-25 RX ORDER — SODIUM CHLORIDE 9 G/1000ML
1000 INJECTION, SOLUTION INTRAVENOUS
Refills: 0 | Status: DISCONTINUED | OUTPATIENT
Start: 2025-04-25 | End: 2025-04-25

## 2025-04-25 RX ORDER — ROSUVASTATIN CALCIUM 20 MG/1
5 TABLET, FILM COATED ORAL AT BEDTIME
Refills: 0 | Status: DISCONTINUED | OUTPATIENT
Start: 2025-04-25 | End: 2025-04-25

## 2025-04-25 RX ADMIN — Medication 1 TABLET(S): at 09:15

## 2025-04-25 RX ADMIN — SODIUM CHLORIDE 200 MILLILITER(S): 9 INJECTION, SOLUTION INTRAVENOUS at 05:47

## 2025-04-25 RX ADMIN — Medication 65 MILLILITER(S): at 21:45

## 2025-04-25 RX ADMIN — Medication 25 MILLIGRAM(S): at 10:52

## 2025-04-25 RX ADMIN — Medication 10 MILLIGRAM(S): at 10:52

## 2025-04-25 RX ADMIN — Medication 88 MICROGRAM(S): at 05:28

## 2025-04-25 RX ADMIN — Medication 40 MILLIGRAM(S): at 08:06

## 2025-04-25 RX ADMIN — Medication 20 MILLIGRAM(S): at 11:57

## 2025-04-25 RX ADMIN — Medication 650 MILLIGRAM(S): at 23:19

## 2025-04-25 RX ADMIN — Medication 80 MILLIGRAM(S): at 15:42

## 2025-04-25 RX ADMIN — Medication 650 MILLIGRAM(S): at 11:48

## 2025-04-25 RX ADMIN — Medication 25 MILLIGRAM(S): at 05:47

## 2025-04-25 RX ADMIN — Medication 650 MILLIGRAM(S): at 17:06

## 2025-04-25 RX ADMIN — Medication 1 PACKET(S): at 11:44

## 2025-04-25 RX ADMIN — Medication 80 MILLIGRAM(S): at 23:19

## 2025-04-25 RX ADMIN — Medication 25 MILLIGRAM(S): at 11:43

## 2025-04-25 RX ADMIN — Medication 650 MILLIGRAM(S): at 18:06

## 2025-04-25 RX ADMIN — Medication 25 MILLIGRAM(S): at 21:44

## 2025-04-25 RX ADMIN — Medication 650 MILLIGRAM(S): at 12:48

## 2025-04-25 RX ADMIN — LOSARTAN POTASSIUM 25 MILLIGRAM(S): 100 TABLET, FILM COATED ORAL at 09:15

## 2025-04-25 RX ADMIN — Medication 2 TABLET(S): at 23:19

## 2025-04-25 RX ADMIN — TRAMADOL HYDROCHLORIDE 25 MILLIGRAM(S): 50 TABLET, FILM COATED ORAL at 10:06

## 2025-04-25 RX ADMIN — Medication 650 MILLIGRAM(S): at 05:47

## 2025-04-25 NOTE — PROGRESS NOTE ADULT - ASSESSMENT
49-year-old female s/p pancreatic biopsy via EUS with pancreatitis presents to  ED with CC of Abd pain. Work-up in ED notable for lipase > 3000 and non-contrast CT findings of pancreatitis and heladio-pancreatic collection of blood.    #Pancreatitis  #Heladio-pancreatic collection  #Rash     Plan:  No need of surgical intervention at this time, conservative mgmt  Admitted to surgery for observation and management of pancreatitis   FU GI reccs  C/w IV abx  Serial abdominal exams  IVF   Cont diet as tolerated - REG  PRN analgesic and antiemetic therapy  GI ppx  / DVT ppx (S[facility] only)    Diffuse Rash  -HOLDING IVF mgmt (contacted pharmacy of reaction and IVF LOT number / expiration)  -Stat Benadryl and H2 therapy. PRN Benadryl   -Trial of topical Hydrocortisone 1%  -Hypoallergenic Linens    -Cont to monitor to evolving clinical changes      Hospitalist consultation - eval new onset rash and further clinical optimization     Case and plan discussed with surgical attending    49-year-old female s/p pancreatic biopsy via EUS with pancreatitis presents to  ED with CC of Abd pain. Work-up in ED notable for lipase > 3000 and non-contrast CT findings of pancreatitis and heladio-pancreatic collection of blood.    #Pancreatitis  #Heladio-pancreatic collection  #Rash     Plan:  No need of surgical intervention at this time, conservative mgmt  Admitted to surgery for observation and management of pancreatitis   FU GI reccs  C/w IV abx  Serial abdominal exams  IVF   Cont diet as tolerated - REG  PRN analgesic and antiemetic therapy  GI ppx  / DVT ppx (S[facility] only)    Diffuse Rash  -IVF mgmt (contacted pharmacy of reaction and IVF LOT number / expiration)  -Stat Benadryl and H2 therapy. PRN Benadryl   -Trial of topical Hydrocortisone 1%  -Hypoallergenic Linens    -Cont to monitor to evolving clinical changes      Hospitalist consultation - eval new onset rash and further clinical optimization     Case and plan discussed with surgical attending

## 2025-04-25 NOTE — PROGRESS NOTE ADULT - SUBJECTIVE AND OBJECTIVE BOX
CC: Patient is a 49y old  Female who presents with a chief complaint of pancreatitis (25 Apr 2025 07:26)    Overnight: No acute events     Subjective:  Pt seen and examined at bedside with surgical attending in AM. Pt is AAOx3, in no acute distress. Endorsing new onset of diffuse rashes. Pt denied c/o fever, chills, chest pain, SOB, abd pain, N/V/D, extremity pain or dysfunction, hemoptysis, hematemesis, hematuria, hematochezia, headache, diplopia, vertigo, dizziness.  *Noted to have b/l upper & lower ext and anterior abdominal patchy rashes (non pruritic) . Remains HD stable        ROS:  otherwise as abovementioned ROS    Vital Signs Last 24 Hrs  T(C): 37.2 (25 Apr 2025 10:58), Max: 37.3 (25 Apr 2025 00:09)  T(F): 99 (25 Apr 2025 10:58), Max: 99.1 (25 Apr 2025 00:09)  HR: 109 (25 Apr 2025 10:58) (76 - 109)  BP: 171/85 (25 Apr 2025 10:58) (107/55 - 171/85)  BP(mean): --  RR: 18 (25 Apr 2025 10:58) (16 - 18)  SpO2: 96% (25 Apr 2025 10:58) (93% - 97%)    Parameters below as of 25 Apr 2025 07:36  Patient On (Oxygen Delivery Method): room air        Labs:             13.2   19.81 )-----------( 188      ( 25 Apr 2025 08:33 )             41.0     CBC Full  -  ( 25 Apr 2025 08:33 )  WBC Count : 19.81 K/uL  RBC Count : 4.36 M/uL  Hemoglobin : 13.2 g/dL  Hematocrit : 41.0 %  Platelet Count - Automated : 188 K/uL  Mean Cell Volume : 94.0 fl  Mean Cell Hemoglobin : 30.3 pg  Mean Cell Hemoglobin Concentration : 32.2 g/dL  Auto Neutrophil # : x  Auto Lymphocyte # : x  Auto Monocyte # : x  Auto Eosinophil # : x  Auto Basophil # : x  Auto Neutrophil % : x  Auto Lymphocyte % : x  Auto Monocyte % : x  Auto Eosinophil % : x  Auto Basophil % : x    04-25    139  |  107  |  7   ----------------------------<  85  3.2[L]   |  26  |  0.61    Ca    9.1      25 Apr 2025 06:35  Phos  1.9     04-25  Mg     1.7     04-25    TPro  6.2  /  Alb  2.8[L]  /  TBili  0.9  /  DBili  x   /  AST  18  /  ALT  30  /  AlkPhos  66  04-25    LIVER FUNCTIONS - ( 25 Apr 2025 06:35 )  Alb: 2.8 g/dL / Pro: 6.2 gm/dL / ALK PHOS: 66 U/L / ALT: 30 U/L / AST: 18 U/L / GGT: x           PT/INR - ( 23 Apr 2025 22:15 )   PT: 12.3 sec;   INR: 1.04 ratio         PTT - ( 23 Apr 2025 22:15 )  PTT:27.2 sec      Meds:  acetaminophen     Tablet .. 650 milliGRAM(s) Oral every 6 hours  acetaminophen   IVPB .. 1000 milliGRAM(s) IV Intermittent once PRN  diphenhydrAMINE 25 milliGRAM(s) Oral every 6 hours PRN  hydrocortisone 1% Cream 1 Application(s) Topical two times a day  lactobacillus acidophilus 1 Tablet(s) Oral daily  levothyroxine 88 MICROGram(s) Oral daily  losartan 25 milliGRAM(s) Oral daily  ondansetron Injectable 4 milliGRAM(s) IV Push every 6 hours PRN  oxyCODONE    IR 2.5 milliGRAM(s) Oral every 4 hours PRN  oxyCODONE    IR 5 milliGRAM(s) Oral every 4 hours PRN  pantoprazole    Tablet 40 milliGRAM(s) Oral before breakfast  senna 2 Tablet(s) Oral at bedtime PRN  simethicone 80 milliGRAM(s) Chew four times a day PRN        Physical exam:  Pt is aaox3  Pt in no acute distress  Psych: normal affect  Resp: CTAB  CVS: S1S2(+)  ABD: mild llq ttp, bowel sounds (+), soft, non distended, no rebound, no guarding, no rigidity, no skin changes to exam.   EXT: no calf tenderness or edema to exam b/l, on VTE prophylaxis  Skin: + b/l upper & lower ext and anterior abdominal patchy rashes (non pruritic)

## 2025-04-25 NOTE — CONSULT NOTE ADULT - SUBJECTIVE AND OBJECTIVE BOX
Patient is a 49y old  Female who presents with a chief complaint of     HPI:  Pt undewent EUS-guided biopsy of pancreatic mass on 25.  Procedure technically difficult due to location/small size/adjacent vascularity of lesion.  Pt with some post-procedural upper abdominal pain.  wAs stable for discharge to home.  later that day her pain became progressively worse, 9/10 with associated vomiting.  I spoke to pt by phone and advised her to come to ED.    Work-up in ED notable for lipase > 3000 and non-contrast CT findings of pancreatitis and heladio-pancreatic collection of blood.  Pt received IV pain medication with relief of upper abdominal pain.  started on cipro/flagyl.    currently resting comfortably.  upper abd pain appears resolved.  notes some lower pelvic pain.  no fevers.  no recurrent n/v.  no BM since the procedure.      PAST MEDICAL & SURGICAL HISTORY:  HLD (hyperlipidemia)      CAD (coronary artery disease)      HTN (hypertension)      History of Hashimoto thyroiditis      H/O  section      History of endometrial ablation          MEDICATIONS  (STANDING):  acetaminophen     Tablet .. 650 milliGRAM(s) Oral every 6 hours  ciprofloxacin   IVPB 400 milliGRAM(s) IV Intermittent every 8 hours  ciprofloxacin   IVPB      levothyroxine 88 MICROGram(s) Oral daily  losartan 25 milliGRAM(s) Oral daily  metroNIDAZOLE  IVPB      metroNIDAZOLE  IVPB 500 milliGRAM(s) IV Intermittent every 8 hours  sodium chloride 0.9%. 1000 milliLiter(s) (200 mL/Hr) IV Continuous <Continuous>    MEDICATIONS  (PRN):  HYDROmorphone  Injectable 1 milliGRAM(s) IV Push every 4 hours PRN Severe Pain (7 - 10)  ondansetron Injectable 4 milliGRAM(s) IV Push every 6 hours PRN Nausea  senna 2 Tablet(s) Oral at bedtime PRN Constipation      Allergies    iodinated radiocontrast agents (Unknown)  penicillins (Unknown)  fruit (Unknown)  shellfish. (Unknown)  avocado (Unknown)    Intolerances        SOCIAL HISTORY:    FAMILY HISTORY:      REVIEW OF SYSTEMS:    CONSTITUTIONAL: No weakness, fevers or chills  EYES/ENT: No visual changes;  No vertigo or throat pain   NECK: No pain or stiffness  RESPIRATORY: No cough, wheezing, hemoptysis; No shortness of breath  CARDIOVASCULAR: No chest pain or palpitations  GASTROINTESTINAL: see HPI  GENITOURINARY: No dysuria, frequency or hematuria  NEUROLOGICAL: No numbness or weakness  SKIN: No itching, burning, rashes, or lesions   PSYCH: Normal mood and affect  All other review of systems is negative unless indicated above.    Vital Signs Last 24 Hrs  T(C): 36.9 (2025 06:15), Max: 36.9 (2025 20:58)  T(F): 98.5 (2025 06:15), Max: 98.5 (2025 06:15)  HR: 80 (2025 06:15) (53 - 89)  BP: 138/73 (2025 06:15) (107/92 - 157/89)  BP(mean): 105 (2025 20:58) (105 - 105)  RR: 18 (2025 06:15) (11 - 19)  SpO2: 96% (2025 06:15) (96% - 99%)    Parameters below as of 2025 06:15  Patient On (Oxygen Delivery Method): room air        PHYSICAL EXAM:    Constitutional: No acute distress, well-developed, non-toxic appearing  HEENT: good phonation, not icteric  Gastrointestinal: soft, non-tender, non-distended, no rebound or guarding  Extremities: No peripheral edema, no cyanosis or clubbing  Neurological: A/O x 3, no focal deficits, no asterixis  Psychiatric: Normal mood, normal affect  Skin: No rashes, not jaundiced    LABS:                        14.3   14.43 )-----------( 222      ( 2025 06:57 )             44.8     04-23    141  |  110[H]  |  9   ----------------------------<  146[H]  3.5   |  23  |  0.88    Ca    9.5      2025 22:15    TPro  7.6  /  Alb  3.7  /  TBili  1.1  /  DBili  x   /  AST  62[H]  /  ALT  60  /  AlkPhos  93  04-23    PT/INR - ( 2025 22:15 )   PT: 12.3 sec;   INR: 1.04 ratio         PTT - ( 2025 22:15 )  PTT:27.2 sec  LIVER FUNCTIONS - ( 2025 22:15 )  Alb: 3.7 g/dL / Pro: 7.6 gm/dL / ALK PHOS: 93 U/L / ALT: 60 U/L / AST: 62 U/L / GGT: x             RADIOLOGY & ADDITIONAL STUDIES:
 49-year-old female past medical history cholelithiasis with later incidental pancreatic cyst that MRI suggested it can be a neuroendocrine tumor for which she underwent pancreatic biopsy via EUS today. She states in the afternoon she experienced abdominal pain and vomiting. Denies fever or chills, SOB, chest pain.    Vitals:  T(C): 36.9 (04-23 @ 20:58), Max: 36.9 (04-23 @ 20:58)  HR: 89 (04-23 @ 20:58) (89 - 89)  BP: 157/89 (04-23 @ 20:58) (157/89 - 157/89)  RR: 19 (04-23 @ 20:58) (19 - 19)  SpO2: 98% (04-23 @ 20:58) (98% - 98%)      Physical Exam:    General: AAOx3, Well developed, NAD  Chest: Normal respiratory effort  Heart: RRR  Abdomen: Soft, ND, minimal tender, no masses  Neuro/Psych: No localized deficits. Normal speech, normal tone  Skin: Normal, no rashes, no lesions noted.       04-23 @ 22:15                    15.3  CBC: 14.55>)-------(<264                     45.8                 141 | 110 | 9    CMP:  ----------------------< 146               3.5 | 23 | 0.88                      Ca:9.5  Phos:-  Mg:-               1.1|      |62        LFTs:  ------|93|-----             -|      |-      Current Inpatient Medications:  ciprofloxacin   IVPB 400 milliGRAM(s) IV Intermittent once  ciprofloxacin   IVPB 400 milliGRAM(s) IV Intermittent every 8 hours  ciprofloxacin   IVPB      methylPREDNISolone sodium succinate Injectable 125 milliGRAM(s) IV Push Once  metroNIDAZOLE  IVPB      metroNIDAZOLE  IVPB 500 milliGRAM(s) IV Intermittent once  metroNIDAZOLE  IVPB 500 milliGRAM(s) IV Intermittent every 8 hours      
Chief Complaint: abdominal pain, rash    HPI:       49-year-old female with PMH of HTN, HLD, Hypothyroidism, cholelithiasis with later incidental pancreatic cyst that MRI suggested it can be a neuroendocrine tumor for which she underwent pancreatic biopsy via EUS  on 25 . She states in the afternoon she experienced abdominal pain and vomiting. Denies fever or chills, SOB, chest pain  Developed progressively worsening abdominal pain with vomiting.  Sought medical attention in the ED.  Work-up in ED notable for lipase > 3000 and non-contrast CT findings of pancreatitis and heladio-pancreatic collection of blood.  Medical team consulted for medical management      - pt seen and examined, denies cp, dyspnea,  cough, + rash in am, all IV abx, IV fluids , rash almost completely resolved after IV benadryl IV pepcid, denies nausea, vomiting, lower abdominal pain better, upper abdominal pain still persist, afebrile, plan discussed     REVIEW OF SYSTEMS:    CONSTITUTIONAL: No weakness, fevers or chills  EYES/ENT: No visual changes;  No vertigo or throat pain   NECK: No pain or stiffness  RESPIRATORY: No cough, wheezing, hemoptysis; No shortness of breath  CARDIOVASCULAR: No chest pain or palpitations  GASTROINTESTINAL: +  abdominal +  epigastric pain. No nausea, vomiting, or hematemesis; No diarrhea or constipation. No melena or hematochezia. + bloating   GENITOURINARY: No dysuria, frequency or hematuria  NEUROLOGICAL: No numbness or weakness  SKIN: No itching, burning, rashes, or lesions   All other review of systems is negative unless indicated above    PAST MEDICAL & SURGICAL HISTORY:  HLD (hyperlipidemia)  CAD (coronary artery disease)  HTN (hypertension)  History of Hashimoto thyroiditis  H/O  section  History of endometrial ablation      Social history : Denies ETOH use, IVDU, smoking   No pertinent history of stroke, MI,  DM  in first degree relatives. + prostate cancer dad    Vital Signs Last 24 Hrs  T(C): 37.2 (2025 10:58), Max: 37.3 (2025 00:09)  T(F): 99 (2025 10:58), Max: 99.1 (2025 00:09)  HR: 109 (2025 10:58) (84 - 109)  BP: 171/85 (2025 10:58) (134/71 - 171/85)  BP(mean): --  RR: 18 (2025 10:58) (18 - 18)  SpO2: 96% (2025 10:58) (96% - 97%)    Parameters below as of 2025 07:36  Patient On (Oxygen Delivery Method): room air    I&O's Summary  CAPILLARY BLOOD GLUCOSE    PHYSICAL EXAM:    Constitutional: NAD, awake and alert  HEENT: PERR, EOMI, Normal Hearing, MMM  Neck: Soft and supple, No LAD, No JVD  Respiratory: Breath sounds are clear bilaterally, No wheezing, rales or rhonchi  Cardiovascular: S1 and S2, regular rate and rhythm, no Murmurs, gallops or rubs  Gastrointestinal: Bowel Sounds present, soft, nontender, nondistended, no guarding, no rebound  Extremities: No peripheral edema  Vascular: 2+ peripheral pulses  Neurological: A/O x 3, no focal deficits  Musculoskeletal: 5/5 strength b/l upper and lower extremities  Skin: No rashes        Labs: All Labs Reviewed:                        13.2   19.81 )-----------( 188      ( 2025 08:33 )             41.0     04-25    139  |  107  |  7   ----------------------------<  85  3.2[L]   |  26  |  0.61    Ca    9.1      2025 06:35  Phos  1.9     04-25  Mg     1.7     04-25    TPro  6.2  /  Alb  2.8[L]  /  TBili  0.9  /  DBili  x   /  AST  18  /  ALT  30  /  AlkPhos  66  04-25    PT/INR - ( 2025 22:15 )   PT: 12.3 sec;   INR: 1.04 ratio         PTT - ( 2025 22:15 )  PTT:27.2 sec    < from: Xray Chest 1 View- PORTABLE-Urgent (Xray Chest 1 View- PORTABLE-Urgent .) (25 @ 08:44) >  IMPRESSION:  No active parenchymal disease in the chest.    < end of copied text >  Culture - Blood (25 @ 01:15)    Specimen Source: Blood None   Culture Results:   No growth at 24 hours    Culture - Blood (04.24.25 @ 01:15)    Specimen Source: Blood None   Culture Results:   No growth at 24 hours    Amylase / Lipase Trend  25 @ 06:35   -   -- / 477[H]  25 @ 06:57   -   -- / 1446[H]  25 @ 22:15   -   -- / 3225[H]        Blood Culture: Organism --  Gram Stain Blood -- Gram Stain --  Specimen Source Blood None  Culture-Blood --        RADIOLOGY/EKG: all reviewed     MEDICATIONS  (STANDING):  acetaminophen     Tablet .. 650 milliGRAM(s) Oral every 6 hours  hydrocortisone 1% Cream 1 Application(s) Topical two times a day  lactobacillus acidophilus 1 Tablet(s) Oral daily  levothyroxine 88 MICROGram(s) Oral daily  losartan 25 milliGRAM(s) Oral daily  pantoprazole    Tablet 40 milliGRAM(s) Oral before breakfast  simethicone 80 milliGRAM(s) Chew four times a day  sodium chloride 0.9%. 1000 milliLiter(s) (65 mL/Hr) IV Continuous <Continuous>    MEDICATIONS  (PRN):  acetaminophen   IVPB .. 1000 milliGRAM(s) IV Intermittent once PRN Mild Pain (1 - 3)  diphenhydrAMINE 25 milliGRAM(s) Oral every 6 hours PRN Rash and/or Itching  ondansetron Injectable 4 milliGRAM(s) IV Push every 6 hours PRN Nausea  oxyCODONE    IR 2.5 milliGRAM(s) Oral every 4 hours PRN Moderate Pain (4 - 6)  oxyCODONE    IR 5 milliGRAM(s) Oral every 4 hours PRN Severe Pain (7 - 10)  senna 2 Tablet(s) Oral at bedtime PRN Constipation

## 2025-04-25 NOTE — CONSULT NOTE ADULT - ASSESSMENT
50 YO F w/hx of pancreatic solid lesion (sub-cm) and pancreatic cyst (sub-cm), s/p EUS/core biopsy of pancreatic mass on 4/23/25.  Developed progressively worsening abdominal pain with vomiting.  Sought medical attention in the ED.  Work-up in ED notable for lipase > 3000 and non-contrast CT findings of pancreatitis and heladio-pancreatic collection of blood.    Abd pain currently controlled/resolved. Now only c/o mild lower pelvic discomfort.  VSS, afebrile.  Exam unimpressive.  Labs as noted: leukocytosis, normal Hgb, elevated lipase > 3000, mild elevation AST  CT reviewed.  Heladio-pancreatic stranding c/w pancreatitis.  Collection of blood between stomach and pancreas, and some fluid tracking to pelvis.    Clinical picture c/w post-needle biopsy pancreatitis and likely self-limited post-biopsy bleeding.  Pt appears clinically stable.  pelvic pain likely from fluid/blood tracking to pelvis.  appreciate surgical evaluation.    Recs:  - supportive care for pancreatitis with IVF. Change IVF to Lactated Ringers at 200 ml/hr  - Follow Hct, BUN as surrogate for fluid status (goal for both to trend down)  - If pt becomes hemodynamically unstable, then would likely need STAT CTA to assess for further bleeding  - OK to start CLD  - pain control PRN
 49-year-old female s/p pancreatic biopsy via EUS with pancreatitis.  Abdomen is soft, minimally tender    CT abd:   Exam limited by noncontrast technique.  Peripancreatic fluid and stranding compatible with pancreatitis. Small to   moderate volume hemorrhage noted anterior to the pancreas, between the   pancreas and stomach and extending to the pericholecystic region. Small   volume hemoperitoneum also noted in the pelvis.    No need of surgical intervention  Medical management of pancreatitis  FU GI reccs  serial abdominal exams  IVF    Case discussed with Dr. Hughes  
    49-year-old female with PMH of HTN, HLD, Hypothyroidism, cholelithiasis with later incidental pancreatic cyst that MRI suggested it can be a neuroendocrine tumor for which she underwent pancreatic biopsy via EUS  on 4/23/25 . She states in the afternoon she experienced abdominal pain and vomiting. Denies fever or chills, SOB, chest pain  Developed progressively worsening abdominal pain with vomiting.  Sought medical attention in the ED.  Work-up in ED notable for lipase > 3000 and non-contrast CT findings of pancreatitis and heladio-pancreatic collection of blood.  Medical team consulted for medical management       Assessment/Plan:    Diffuse abdominal pain due to s/p pancreatic lesion biopsy pancreatitis and likely self-limited post-biopsy bleeding  - CT abd Peripancreatic fluid and stranding compatible with pancreatitis. Small to  moderate volume hemorrhage noted anterior to the pancreas, between the   pancreas and stomach and extending to the pericholecystic region. Small  volume hemoperitoneum also noted in the pelvis.  - Amylase / Lipase Trend  04-25-25 @ 06:35   -   -- / 477[H]  04-24-25 @ 06:57   -   -- / 1446[H]  04-23-25 @ 22:15   -   -- / 3225[H]  - as per surgery team   - GI attending If pt becomes hemodynamically unstable, then would likely need STAT CTA to assess for further bleeding  - diet as per surgery   - IVF , PPI   -  trend lipase  - WBC 14--> 19 -->   - blood cx x2 neg   - pain control PRN, pt advised to limit narcotics if possible    Hypokalemia, Hypophosphatemia  - replace monitor    Constipation, Bloating   -  repeat suppository for constipation   - c/w senna  - simethicone  - probiotics    HTN  - c/w losartan    Hypothyroidism  - c/w levothyroxine    Rash likely due to IV abx   - s/p IV cipro and Flagyl   - c/w IV /PO pepcid for 2 more days  - benadryl prn      DVT PPX: as per surgery, no heparin due to bleed     Advance Directive:  - Full code, diagnosis, prognosis discussed  - HCP  [facility] [phone]  - 17 minutes spend    Disposition: as per surgery    Time Span: 75 min    Thank you for consult, will follow with you.

## 2025-04-25 NOTE — PROGRESS NOTE ADULT - SUBJECTIVE AND OBJECTIVE BOX
"Daily Note     Today's date: 2024  Patient name: Li Torre  : 1960  MRN: [id number]  Referring provider: Derrick Vasquez MD  Dx:   Encounter Diagnosis     ICD-10-CM    1. S/P reverse total shoulder arthroplasty, right  Z96.611       2. Ambulatory dysfunction  R26.2       3. Weakness of both lower extremities  R29.898           Start Time: 1125  Stop Time: 1205  Total time in clinic (min): 40 minutes    Subjective: Pt stated that she is doing much better today compared to yesterday where she had a bad migraine.       Objective: See treatment diary below      Assessment: Pt tolerated warm up on bike well at beginning of session without increase in discomfort during or after activity. Pt performed BLE strengthening activities well throughout session but continues to have difficulty with hip abd strengthening exercises. Pt shows slightly improved gait mechanics when ambulating throughout clinic but does continue to have slight lateral trunk lean due to hip abd weakness still. Pt would benefit from continued skilled PT to improve BLE strength, mobility, ROM, balance, gait, and functional ability.     Plan: Continue per plan of care.  Progress treatment as tolerated.       Precautions: S/p R reverse TSA on 10/24/23.  As per MD: biceps/triceps strengthening up to her weight restriction (25#).  Light deltoid and periscapular strength to tolerance.  No overhead weights. No RTC strengthening     Date        Visit #    26 27 28 29 30 31    FOTO             Re-eval     done          Manuals                                                            Neuro Re-Ed        Quad sets     Nv R    20x3\" ea    clamshells    2x10 purp TB ea 2x10 purp TB ea Purple 3\" 2x10 ea  2x10 3\" purp 2x10 3\" purp 2x10 3\" purp     marching    2x10 purp stand ea 2x10 Purp h/l ea Std 1 UE supp x10 ea  2x10 h/l purp TB 2x10 h/l purp TB 2x10 h/l purp " "TB    bridges    2x10 5\" w purp abd 2x10 5\" purp abd Pur abd 5\" 2x10  2x10 5\" purp TB abd 2x10 5\" purp TB abd 2x10 5\" purple TB abd    LAQ    2x10 5\" 3# ea 2x10 5\" 3# ea SAQ 5\" 2x10 3# LAQ 2x10 5\" 3# ea  2x10 5\" 3# ea  2x10 5\" 3# ea    Scap punches                          Pt Edu             Ther Ex    5/30 6/6 6/11 6/13 6/18 6/21    bike    6' 6' 6 min  6' 6' 6'    UBE    3'/3'         SLR      W/ QS 3# 2x10 ea  2x10 3# ea 2x10 3# ea 2x10 3# ea    S/l hip abd    2x10 0# ea 2x10 0# ea 0# 2x10 ea  2x10 0# ea 2x10 0# ea 2x10 0# ea    Standing hip abd/ext    2x10 purple ea  Purple 2x10 ea  2x10 purp TB ea 2x10 purp TB ea 2x10 purp TB ea    sidestepping      Purple 4 laps   4 laps purp TB 4 laps purp TB     Leg press    2x15 205# BLE  205# 2x15 2x15 205# BLE 2x15 205# BLE 2x15 205# BLE    Monster walks             Shoulder raises    2x10 2# flex, abd to 90  NP 2x10 2# flex, abd to 90  2x10 2# flex, abd to 90      Rows/exts    2x15 10# ea  NP np      Bicep curls       2x10 5# ea 2x10 5# ea     Tricep exts             Wall slides abd             Finger ladder                          Ther Activity       6/13 6/18 6/21    squats             Step ups       2x10 ea up 0R 2x10 ea up 0R 2x10 ea up 0R    Gait Training        6/18     hurdles                          Modalities        6/18                                                                                    " Patient is a 49y old  Female who presents with a chief complaint of pancreatitis (24 Apr 2025 07:53)    Pt improved today.  Upper abdomoinal pain essentially resolved.  Main pain yesterday was lower abd/pelvic pain, which is not improved, but still present.  tolerated CLD.  has been OOB and ambulating.  no BM, despite suppository.      MEDICATIONS  (STANDING):  acetaminophen     Tablet .. 650 milliGRAM(s) Oral every 6 hours  lactated ringers. 1000 milliLiter(s) (200 mL/Hr) IV Continuous <Continuous>  lactobacillus acidophilus 1 Tablet(s) Oral daily  levothyroxine 88 MICROGram(s) Oral daily  losartan 25 milliGRAM(s) Oral daily    MEDICATIONS  (PRN):  diphenhydrAMINE 25 milliGRAM(s) Oral every 6 hours PRN Rash and/or Itching  HYDROmorphone  Injectable 1 milliGRAM(s) IV Push every 4 hours PRN Severe Pain (7 - 10)  ondansetron Injectable 4 milliGRAM(s) IV Push every 6 hours PRN Nausea  senna 2 Tablet(s) Oral at bedtime PRN Constipation      Vital Signs Last 24 Hrs  T(C): 37.3 (25 Apr 2025 00:09), Max: 37.3 (25 Apr 2025 00:09)  T(F): 99.1 (25 Apr 2025 00:09), Max: 99.1 (25 Apr 2025 00:09)  HR: 84 (25 Apr 2025 00:09) (76 - 84)  BP: 134/71 (25 Apr 2025 00:09) (107/55 - 146/87)  BP(mean): 87 (24 Apr 2025 11:37) (87 - 87)  RR: 18 (25 Apr 2025 00:09) (16 - 18)  SpO2: 96% (25 Apr 2025 00:09) (93% - 96%)    Parameters below as of 25 Apr 2025 00:09  Patient On (Oxygen Delivery Method): room air        PHYSICAL EXAM:    Constitutional: No acute distress, well-developed, non-toxic appearing  HEENT: good phonation, not icteric  Gastrointestinal: soft, non-tender, non-distended, no rebound or guarding  Neurological: A/O x 3, no focal deficits, no asterixis  Psychiatric: Normal mood, normal affect  Skin: No rashes, not jaundiced    LABS:                        14.3   14.43 )-----------( 222      ( 24 Apr 2025 06:57 )             44.8     04-24    139  |  109[H]  |  8   ----------------------------<  90  3.9   |  25  |  0.56    Ca    8.9      24 Apr 2025 06:57    TPro  6.8  /  Alb  3.3  /  TBili  0.7  /  DBili  x   /  AST  26  /  ALT  47  /  AlkPhos  73  04-24    PT/INR - ( 23 Apr 2025 22:15 )   PT: 12.3 sec;   INR: 1.04 ratio         PTT - ( 23 Apr 2025 22:15 )  PTT:27.2 sec  LIVER FUNCTIONS - ( 24 Apr 2025 06:57 )  Alb: 3.3 g/dL / Pro: 6.8 gm/dL / ALK PHOS: 73 U/L / ALT: 47 U/L / AST: 26 U/L / GGT: x             RADIOLOGY & ADDITIONAL STUDIES:

## 2025-04-25 NOTE — CONSULT NOTE ADULT - TIME BILLING
Review of chart/labs/imaging, history and exam of patient, face-to-face counseling with patient, documentation
I spend mentioned above total minutes on direct patient care on the date of this encounter . This includes reviewing prior documentation, results and imaging in addition to completing a full history and physical examination on the patient. Further tests, medications, and procedures have been ordered as indicated. Laboratory results and the plan of care were communicated to the patient and/or their family member. Supporting documentation was completed and added to the patient's chart.   .

## 2025-04-25 NOTE — PROGRESS NOTE ADULT - ASSESSMENT
48 YO F w/hx of pancreatic solid lesion (sub-cm) and pancreatic cyst (sub-cm), s/p EUS/core biopsy of pancreatic mass on 4/23/25.  Developed progressively worsening abdominal pain with vomiting.  Sought medical attention in the ED.  Work-up in ED notable for lipase > 3000 and non-contrast CT findings of pancreatitis and heladio-pancreatic collection of blood.    Upper abd pain essentially resolved.  Lower abd pain/pelvic pain much improved.  VSS, afebrile.  Exam unimpressive.  Labs drawn today, pending.    Clinical picture c/w post-needle biopsy pancreatitis and likely self-limited post-biopsy bleeding.  Pt appears clinically stable.  pelvic pain likely from fluid/blood tracking to pelvis.  appreciate surgical evaluation.    Recs:  - supportive care for pancreatitis with IVF. Decrease Lactated Ringers to 100 ml/hr  - Follow Hct, BUN as surrogate for fluid status (goal for both to trend down)  - If pt becomes hemodynamically unstable, then would likely need STAT CTA to assess for further bleeding  - Advance to soft food diet  - Encourage OOB  - pain control PRN, pt advised to limit narcotics if possible  - repeat suppository for constipation, likely ileus    Anticipate possible d/c home tomorrow

## 2025-04-26 LAB
ALBUMIN SERPL ELPH-MCNC: 2.5 G/DL — LOW (ref 3.3–5)
ALP SERPL-CCNC: 72 U/L — SIGNIFICANT CHANGE UP (ref 40–120)
ALT FLD-CCNC: 22 U/L — SIGNIFICANT CHANGE UP (ref 12–78)
AMYLASE P1 CFR SERPL: 158 U/L — HIGH (ref 25–115)
ANION GAP SERPL CALC-SCNC: 8 MMOL/L — SIGNIFICANT CHANGE UP (ref 5–17)
AST SERPL-CCNC: 12 U/L — LOW (ref 15–37)
BASOPHILS # BLD AUTO: 0.05 K/UL — SIGNIFICANT CHANGE UP (ref 0–0.2)
BASOPHILS NFR BLD AUTO: 0.3 % — SIGNIFICANT CHANGE UP (ref 0–2)
BILIRUB SERPL-MCNC: 0.9 MG/DL — SIGNIFICANT CHANGE UP (ref 0.2–1.2)
BUN SERPL-MCNC: 6 MG/DL — LOW (ref 7–23)
CALCIUM SERPL-MCNC: 8.4 MG/DL — LOW (ref 8.5–10.1)
CHLORIDE SERPL-SCNC: 108 MMOL/L — SIGNIFICANT CHANGE UP (ref 96–108)
CO2 SERPL-SCNC: 23 MMOL/L — SIGNIFICANT CHANGE UP (ref 22–31)
CREAT SERPL-MCNC: 0.42 MG/DL — LOW (ref 0.5–1.3)
CRP SERPL-MCNC: 267 MG/L — HIGH (ref 0–5)
EGFR: 118 ML/MIN/1.73M2 — SIGNIFICANT CHANGE UP
EGFR: 118 ML/MIN/1.73M2 — SIGNIFICANT CHANGE UP
EOSINOPHIL # BLD AUTO: 0.02 K/UL — SIGNIFICANT CHANGE UP (ref 0–0.5)
EOSINOPHIL NFR BLD AUTO: 0.1 % — SIGNIFICANT CHANGE UP (ref 0–6)
GLUCOSE BLDC GLUCOMTR-MCNC: 96 MG/DL — SIGNIFICANT CHANGE UP (ref 70–99)
GLUCOSE SERPL-MCNC: 80 MG/DL — SIGNIFICANT CHANGE UP (ref 70–99)
HCT VFR BLD CALC: 38.3 % — SIGNIFICANT CHANGE UP (ref 34.5–45)
HGB BLD-MCNC: 12.4 G/DL — SIGNIFICANT CHANGE UP (ref 11.5–15.5)
IMM GRANULOCYTES # BLD AUTO: 0.12 K/UL — HIGH (ref 0–0.07)
IMM GRANULOCYTES NFR BLD AUTO: 0.6 % — SIGNIFICANT CHANGE UP (ref 0–0.9)
LIDOCAIN IGE QN: 110 U/L — HIGH (ref 13–75)
LYMPHOCYTES # BLD AUTO: 1.27 K/UL — SIGNIFICANT CHANGE UP (ref 1–3.3)
LYMPHOCYTES NFR BLD AUTO: 6.5 % — LOW (ref 13–44)
MAGNESIUM SERPL-MCNC: 1.8 MG/DL — SIGNIFICANT CHANGE UP (ref 1.6–2.6)
MCHC RBC-ENTMCNC: 30.2 PG — SIGNIFICANT CHANGE UP (ref 27–34)
MCHC RBC-ENTMCNC: 32.4 G/DL — SIGNIFICANT CHANGE UP (ref 32–36)
MCV RBC AUTO: 93.4 FL — SIGNIFICANT CHANGE UP (ref 80–100)
MONOCYTES # BLD AUTO: 1.03 K/UL — HIGH (ref 0–0.9)
MONOCYTES NFR BLD AUTO: 5.3 % — SIGNIFICANT CHANGE UP (ref 2–14)
NEUTROPHILS # BLD AUTO: 17.1 K/UL — HIGH (ref 1.8–7.4)
NEUTROPHILS NFR BLD AUTO: 87.2 % — HIGH (ref 43–77)
NRBC # BLD AUTO: 0 K/UL — SIGNIFICANT CHANGE UP (ref 0–0)
NRBC # FLD: 0 K/UL — SIGNIFICANT CHANGE UP (ref 0–0)
NRBC BLD AUTO-RTO: 0 /100 WBCS — SIGNIFICANT CHANGE UP (ref 0–0)
PHOSPHATE SERPL-MCNC: 1.6 MG/DL — LOW (ref 2.5–4.5)
PLATELET # BLD AUTO: 194 K/UL — SIGNIFICANT CHANGE UP (ref 150–400)
PMV BLD: 11.3 FL — SIGNIFICANT CHANGE UP (ref 7–13)
POTASSIUM SERPL-MCNC: 3.3 MMOL/L — LOW (ref 3.5–5.3)
POTASSIUM SERPL-SCNC: 3.3 MMOL/L — LOW (ref 3.5–5.3)
PROCALCITONIN SERPL-MCNC: 0.14 NG/ML — HIGH (ref 0.02–0.1)
PROT SERPL-MCNC: 6.1 GM/DL — SIGNIFICANT CHANGE UP (ref 6–8.3)
RBC # BLD: 4.1 M/UL — SIGNIFICANT CHANGE UP (ref 3.8–5.2)
RBC # FLD: 12.3 % — SIGNIFICANT CHANGE UP (ref 10.3–14.5)
SODIUM SERPL-SCNC: 139 MMOL/L — SIGNIFICANT CHANGE UP (ref 135–145)
TSH SERPL-MCNC: 1.43 UU/ML — SIGNIFICANT CHANGE UP (ref 0.34–4.82)
WBC # BLD: 19.59 K/UL — HIGH (ref 3.8–10.5)
WBC # FLD AUTO: 19.59 K/UL — HIGH (ref 3.8–10.5)

## 2025-04-26 PROCEDURE — 99232 SBSQ HOSP IP/OBS MODERATE 35: CPT

## 2025-04-26 RX ORDER — MEROPENEM 1 G/30ML
1000 INJECTION INTRAVENOUS EVERY 8 HOURS
Refills: 0 | Status: DISCONTINUED | OUTPATIENT
Start: 2025-04-26 | End: 2025-04-28

## 2025-04-26 RX ORDER — MEROPENEM 1 G/30ML
1000 INJECTION INTRAVENOUS EVERY 8 HOURS
Refills: 0 | Status: DISCONTINUED | OUTPATIENT
Start: 2025-04-26 | End: 2025-04-26

## 2025-04-26 RX ORDER — SODIUM CHLORIDE 9 G/1000ML
1000 INJECTION, SOLUTION INTRAVENOUS ONCE
Refills: 0 | Status: COMPLETED | OUTPATIENT
Start: 2025-04-26 | End: 2025-04-26

## 2025-04-26 RX ORDER — SOD PHOS DI, MONO/K PHOS MONO 250 MG
1 TABLET ORAL ONCE
Refills: 0 | Status: COMPLETED | OUTPATIENT
Start: 2025-04-26 | End: 2025-04-26

## 2025-04-26 RX ADMIN — Medication 65 MILLILITER(S): at 06:44

## 2025-04-26 RX ADMIN — Medication 650 MILLIGRAM(S): at 06:43

## 2025-04-26 RX ADMIN — Medication 80 MILLIGRAM(S): at 20:35

## 2025-04-26 RX ADMIN — Medication 650 MILLIGRAM(S): at 15:09

## 2025-04-26 RX ADMIN — Medication 25 MILLIGRAM(S): at 13:12

## 2025-04-26 RX ADMIN — Medication 4 MILLIGRAM(S): at 20:34

## 2025-04-26 RX ADMIN — Medication 25 MILLIGRAM(S): at 22:49

## 2025-04-26 RX ADMIN — Medication 80 MILLIGRAM(S): at 23:58

## 2025-04-26 RX ADMIN — Medication 40 MILLIGRAM(S): at 06:43

## 2025-04-26 RX ADMIN — Medication 80 MILLIGRAM(S): at 06:43

## 2025-04-26 RX ADMIN — LOSARTAN POTASSIUM 25 MILLIGRAM(S): 100 TABLET, FILM COATED ORAL at 09:26

## 2025-04-26 RX ADMIN — Medication 4 MILLIGRAM(S): at 13:37

## 2025-04-26 RX ADMIN — Medication 88 MICROGRAM(S): at 06:43

## 2025-04-26 RX ADMIN — MEROPENEM 1000 MILLIGRAM(S): 1 INJECTION INTRAVENOUS at 22:49

## 2025-04-26 RX ADMIN — Medication 20 MILLIGRAM(S): at 09:25

## 2025-04-26 RX ADMIN — Medication 650 MILLIGRAM(S): at 15:36

## 2025-04-26 RX ADMIN — Medication 1 TABLET(S): at 09:26

## 2025-04-26 RX ADMIN — Medication 40 MILLIEQUIVALENT(S): at 09:22

## 2025-04-26 RX ADMIN — Medication 650 MILLIGRAM(S): at 20:35

## 2025-04-26 RX ADMIN — SODIUM CHLORIDE 1000 MILLILITER(S): 9 INJECTION, SOLUTION INTRAVENOUS at 19:00

## 2025-04-26 RX ADMIN — MEROPENEM 1000 MILLIGRAM(S): 1 INJECTION INTRAVENOUS at 15:09

## 2025-04-26 RX ADMIN — Medication 1 PACKET(S): at 09:22

## 2025-04-26 RX ADMIN — Medication 650 MILLIGRAM(S): at 23:57

## 2025-04-26 NOTE — PROGRESS NOTE ADULT - SUBJECTIVE AND OBJECTIVE BOX
Chief Complaint: abdominal pain, rash    HPI:       49-year-old female with PMH of HTN, HLD, Hypothyroidism, cholelithiasis with later incidental pancreatic cyst that MRI suggested it can be a neuroendocrine tumor for which she underwent pancreatic biopsy via EUS  on 25 . She states in the afternoon she experienced abdominal pain and vomiting. Denies fever or chills, SOB, chest pain  Developed progressively worsening abdominal pain with vomiting.  Sought medical attention in the ED.  Work-up in ED notable for lipase > 3000 and non-contrast CT findings of pancreatitis and heladio-pancreatic collection of blood.  Medical team consulted for medical management      - pt seen and examined, denies cp, dyspnea,  cough, + rash in am, all IV abx, IV fluids , rash almost completely resolved after IV benadryl IV pepcid, denies nausea, vomiting, lower abdominal pain better, upper abdominal pain still persist, afebrile, plan discussed    - abdominal pain minimal 2/10, tolerating some po intake + flatus, no BM, denies nausea, vomiting, no rash, tolerating IV fluids, less bloating after simethicone    REVIEW OF SYSTEMS:    CONSTITUTIONAL: No weakness, fevers or chills  EYES/ENT: No visual changes;  No vertigo or throat pain   NECK: No pain or stiffness  RESPIRATORY: No cough, wheezing, hemoptysis; No shortness of breath  CARDIOVASCULAR: No chest pain or palpitations  GASTROINTESTINAL: +  abdominal +  epigastric pain. No nausea, vomiting, or hematemesis; No diarrhea or constipation. No melena or hematochezia. + bloating   GENITOURINARY: No dysuria, frequency or hematuria  NEUROLOGICAL: No numbness or weakness  SKIN: No itching, burning, rashes, or lesions   All other review of systems is negative unless indicated above    PAST MEDICAL & SURGICAL HISTORY:  HLD (hyperlipidemia)  CAD (coronary artery disease)  HTN (hypertension)  History of Hashimoto thyroiditis  H/O  section  History of endometrial ablation      Social history : Denies ETOH use, IVDU, smoking   No pertinent history of stroke, MI,  DM  in first degree relatives. + prostate cancer dad    Vital sings reviewed for last 24 h  T(C): 36.9 (25 @ 12:00), Max: 37.7 (25 @ 16:06)  T(F): 98.5 (25 @ 12:00), Max: 99.9 (25 @ 16:06)  HR: 87 (25 @ 12:00) (80 - 104)  BP: 158/78 (25 @ 12:00) (146/77 - 159/78)  RR: 18 (25 @ 12:00) (18 - 18)  SpO2: 96% (25 @ 12:00) (94% - 97%)  CAPILLARY BLOOD GLUCOSE      POCT Blood Glucose.: 96 mg/dL (2025 12:01)      PHYSICAL EXAM:    Constitutional: NAD, awake and alert  HEENT: PERR, EOMI, Normal Hearing, MMM  Neck: Soft and supple, No LAD, No JVD  Respiratory: Breath sounds are clear bilaterally, No wheezing, rales or rhonchi  Cardiovascular: S1 and S2, regular rate and rhythm, no Murmurs, gallops or rubs  Gastrointestinal: Bowel Sounds present, soft, nontender, nondistended, no guarding, no rebound  Extremities: No peripheral edema  Vascular: 2+ peripheral pulses  Neurological: A/O x 3, no focal deficits  Musculoskeletal: 5/5 strength b/l upper and lower extremities  Skin: No rashes        Labs: All Labs Reviewed:                          12.4   19.59 )-----------( 194      ( 2025 06:01 )             38.3         139  |  108  |  6[L]  ----------------------------<  80  3.3[L]   |  23  |  0.42[L]    Ca    8.4[L]      2025 06:01  Phos  1.6       Mg     1.8         TPro  6.1  /  Alb  2.5[L]  /  TBili  0.9  /  DBili  0.3  /  AST  12[L]  /  ALT  22  /  AlkPhos  72          LIVER FUNCTIONS - ( 2025 06:01 )  Alb: 2.5 g/dL / Pro: 6.1 gm/dL / ALK PHOS: 72 U/L / ALT: 22 U/L / AST: 12 U/L / GGT: x               Xray Chest 1 View- PORTABLE-Urgent (Xray Chest 1 View- PORTABLE-Urgent .) (25 @ 08:44) >  IMPRESSION:  No active parenchymal disease in the chest.    < end of copied text >  Culture - Blood (25 @ 01:15)    Specimen Source: Blood None   Culture Results:   No growth at 24 hours    Culture - Blood (25 @ 01:15)    Specimen Source: Blood None   Culture Results:   No growth at 24 hours    Amylase / Lipase Trend  25 @ 06:35   -   -- / 477[H]  25 @ 06:57   -   -- / 1446[H]  25 @ 22:15   -   -- / 3225[H]        Blood Culture: Organism --  Gram Stain Blood -- Gram Stain --  Specimen Source Blood None  Culture-Blood --        RADIOLOGY/EKG: all reviewed     MEDICATIONS  (STANDING):  acetaminophen     Tablet .. 650 milliGRAM(s) Oral every 6 hours  hydrocortisone 1% Cream 1 Application(s) Topical two times a day  lactobacillus acidophilus 1 Tablet(s) Oral daily  levothyroxine 88 MICROGram(s) Oral daily  losartan 25 milliGRAM(s) Oral daily  pantoprazole    Tablet 40 milliGRAM(s) Oral before breakfast  simethicone 80 milliGRAM(s) Chew four times a day  sodium chloride 0.9%. 1000 milliLiter(s) (65 mL/Hr) IV Continuous <Continuous>    MEDICATIONS  (PRN):  acetaminophen   IVPB .. 1000 milliGRAM(s) IV Intermittent once PRN Mild Pain (1 - 3)  diphenhydrAMINE 25 milliGRAM(s) Oral every 6 hours PRN Rash and/or Itching  ondansetron Injectable 4 milliGRAM(s) IV Push every 6 hours PRN Nausea  oxyCODONE    IR 2.5 milliGRAM(s) Oral every 4 hours PRN Moderate Pain (4 - 6)  oxyCODONE    IR 5 milliGRAM(s) Oral every 4 hours PRN Severe Pain (7 - 10)  senna 2 Tablet(s) Oral at bedtime PRN Constipation

## 2025-04-26 NOTE — PROGRESS NOTE ADULT - SUBJECTIVE AND OBJECTIVE BOX
Patient is a 49y old  Female who presents with a chief complaint of abdominal pain (2025 15:34)      Subective: Seen and examined at bedside. Large bowel movements overnight and this morning with flatus. Abdominal discomfort improved but still with limited oral intake.       PAST MEDICAL & SURGICAL HISTORY:  HLD (hyperlipidemia)      CAD (coronary artery disease)      HTN (hypertension)      History of Hashimoto thyroiditis      H/O  section      History of endometrial ablation          MEDICATIONS  (STANDING):  acetaminophen     Tablet .. 650 milliGRAM(s) Oral every 6 hours  famotidine Injectable 20 milliGRAM(s) IV Push daily  hydrocortisone 1% Cream 1 Application(s) Topical two times a day  lactobacillus acidophilus 1 Tablet(s) Oral daily  levothyroxine 88 MICROGram(s) Oral daily  losartan 25 milliGRAM(s) Oral daily  pantoprazole    Tablet 40 milliGRAM(s) Oral before breakfast  simethicone 80 milliGRAM(s) Chew four times a day  sodium chloride 0.9%. 1000 milliLiter(s) (65 mL/Hr) IV Continuous <Continuous>    MEDICATIONS  (PRN):  acetaminophen   IVPB .. 1000 milliGRAM(s) IV Intermittent once PRN Mild Pain (1 - 3)  diphenhydrAMINE 25 milliGRAM(s) Oral every 6 hours PRN Rash and/or Itching  ondansetron Injectable 4 milliGRAM(s) IV Push every 6 hours PRN Nausea  oxyCODONE    IR 2.5 milliGRAM(s) Oral every 4 hours PRN Moderate Pain (4 - 6)  oxyCODONE    IR 5 milliGRAM(s) Oral every 4 hours PRN Severe Pain (7 - 10)  senna 2 Tablet(s) Oral at bedtime PRN Constipation      REVIEW OF SYSTEMS:    RESPIRATORY: No shortness of breath  CARDIOVASCULAR: No chest pain  All other review of systems is negative unless indicated above.    Vital Signs Last 24 Hrs  T(C): 37 (2025 07:53), Max: 37.7 (2025 16:06)  T(F): 98.6 (2025 07:53), Max: 99.9 (2025 16:06)  HR: 80 (2025 07:53) (80 - 109)  BP: 159/78 (2025 07:53) (146/77 - 171/85)  BP(mean): --  RR: 18 (2025 07:53) (18 - 18)  SpO2: 96% (2025 07:53) (94% - 97%)    Parameters below as of 2025 07:53  Patient On (Oxygen Delivery Method): room air        PHYSICAL EXAM:    Constitutional: NAD, well-developed  Respiratory: CTAB  Cardiovascular: S1 and S2, RRR  Gastrointestinal: BS+, soft, NT/ND  Extremities: No peripheral edema  Psychiatric: Normal mood, normal affect    LABS:                        12.4   19.59 )-----------( 194      ( 2025 06:01 )             38.3     04-    139  |  108  |  6[L]  ----------------------------<  80  3.3[L]   |  23  |  0.42[L]    Ca    8.4[L]      2025 06:01  Phos  1.6     -  Mg     1.8         TPro  6.1  /  Alb  2.5[L]  /  TBili  0.9  /  DBili  0.3  /  AST  12[L]  /  ALT  22  /  AlkPhos  72  04-26      LIVER FUNCTIONS - ( 2025 06:01 )  Alb: 2.5 g/dL / Pro: 6.1 gm/dL / ALK PHOS: 72 U/L / ALT: 22 U/L / AST: 12 U/L / GGT: x             RADIOLOGY & ADDITIONAL STUDIES:

## 2025-04-26 NOTE — PROGRESS NOTE ADULT - ASSESSMENT
48 YO F w/hx of pancreatic solid lesion (sub-cm) and pancreatic cyst (sub-cm), s/p EUS/core biopsy of pancreatic mass on 4/23/25.  Developed progressively worsening abdominal pain with vomiting.  Sought medical attention in the ED.  Work-up in ED notable for lipase > 3000 and non-contrast CT findings of pancreatitis and heladio-pancreatic collection of blood.    Upper abd pain essentially resolved.  Lower abd pain/pelvic pain much improved.  VSS, afebrile.  Exam unimpressive.  Labs drawn today, pending.    Clinical picture c/w post-needle biopsy pancreatitis and likely self-limited post-biopsy bleeding.  Pt appears clinically stable.  pelvic pain likely from fluid/blood tracking to pelvis.  appreciate surgical evaluation.    Recs:  - supportive care for pancreatitis with IVF. Decrease Lactated Ringers to 100 ml/hr and dsicontinue once tolerating diet.   - Follow Hct, BUN as surrogate for fluid status (goal for both to trend down)  - If pt becomes hemodynamically unstable, then would likely need STAT CTA to assess for further bleeding  - Low fat diet  - Encourage OOB  - pain control PRN, pt advised to limit narcotics if possible  - Miralax prn     If tolerating diet and remains stable, can be discharged with outpatient follow up with [facility]

## 2025-04-26 NOTE — PROGRESS NOTE ADULT - ASSESSMENT
49-year-old female s/p pancreatic biopsy via EUS with pancreatitis presents to  ED with CC of Abd pain. Work-up in ED notable for lipase > 3000 and non-contrast CT findings of pancreatitis and heladio-pancreatic collection of blood.    #Pancreatitis  #Heladio-pancreatic collection  #Rash improved    Plan:  cont diet  pain control  IV fluids  FU GI reccs  C/w IV abx  Serial abdominal exams  PRN analgesic and antiemetic therapy  GI ppx  / DVT ppx (SCD only)  PRN Benadryl   -Trial of topical Hydrocortisone 1%  -Hypoallergenic Linens    -Cont to monitor to evolving clinical changes    f/u Hospitalist consultation reccs    Case and plan discussed with surgical attending

## 2025-04-26 NOTE — PROGRESS NOTE ADULT - ASSESSMENT
49-year-old female with PMH of HTN, HLD, Hypothyroidism, cholelithiasis with later incidental pancreatic cyst that MRI suggested it can be a neuroendocrine tumor for which she underwent pancreatic biopsy via EUS  on 4/23/25 . She states in the afternoon she experienced abdominal pain and vomiting. Denies fever or chills, SOB, chest pain  Developed progressively worsening abdominal pain with vomiting.  Sought medical attention in the ED.  Work-up in ED notable for lipase > 3000 and non-contrast CT findings of pancreatitis and heladio-pancreatic collection of blood.  Medical team consulted for medical management       Assessment/Plan:    Diffuse abdominal pain due to s/p pancreatic lesion biopsy pancreatitis and likely self-limited post-biopsy bleeding, improved   - CT abd Peripancreatic fluid and stranding compatible with pancreatitis. Small to  moderate volume hemorrhage noted anterior to the pancreas, between the   pancreas and stomach and extending to the pericholecystic region. Small  volume hemoperitoneum also noted in the pelvis.  - Amylase / Lipase Trend  04-25-25 @ 06:35   -   -- / 477[H]  04-24-25 @ 06:57   -   -- / 1446[H]  04-23-25 @ 22:15   -   -- / 3225[H]  - as per surgery team   - GI attending If pt becomes hemodynamically unstable, then would likely need STAT CTA to assess for further bleeding  - diet as per surgery   - IVF , PPI   -  trend lipase 3225--> 110  - WBC 14--> 19 --> 19   - blood cx x2 neg   - pain control PRN, pt advised to limit narcotics if possible    Hypokalemia, Hypophosphatemia  - replace monitor    Constipation, Bloating   -  repeat suppository for constipation   - c/w senna  - simethicone  - probiotics    HTN  - c/w losartan    Hypothyroidism  - c/w levothyroxine    Rash likely due to IV abx   - s/p IV cipro and Flagyl   - c/w IV /PO pepcid for 2 more days  - benadryl prn      DVT PPX: as per surgery, no heparin due to bleed     Advance Directive:  - Full code, diagnosis, prognosis discussed  - HCP  [facility] [phone]  - 17 minutes spend    Disposition: as per surgery        Thank you for consult, will follow with you.

## 2025-04-26 NOTE — PROGRESS NOTE ADULT - ATTENDING COMMENTS
A/P:  49-year-old female s/p pancreatic biopsy via EUS with pancreatitis presents to  ED with CC of Abd pain. Work-up in ED notable for lipase > 3000 and non-contrast CT findings of pancreatitis and heladio-pancreatic collection of blood.    #Pancreatitis  #Heladio-pancreatic collection  #Rash improved    GI on consult  Pain control  Serial abd exams  ABX  F/U labs  Cont current care and meds    35 minutes of time spent on pt examination, review of relevant labs and radiologic studies, discussion with relevant services/providers for coordination of pt care and services, time is exclusive of resident and/or physician assistant teaching or supervision time

## 2025-04-26 NOTE — PROGRESS NOTE ADULT - SUBJECTIVE AND OBJECTIVE BOX
CC:Patient is a 49y old  Female who presents with a chief complaint of abdominal pain (25 Apr 2025 15:34)      Subjective:  Pt seen and examined at bedside this morning, doing well, elda diet and reports rash improved.  Pt is AAOx3, pt in no acute distress. Pt denied c/o fever, chills, chest pain, SOB, abd pain, N/V/D, extremity pain or dysfunction, hemoptysis, hematemesis, hematuria, hematochexia, headache, diplopia, vertigo, dizzyness.   ROS:  otherwise as abovementioned ROS    Vital Signs Last 24 Hrs  T(C): 37.3 (25 Apr 2025 23:55), Max: 37.7 (25 Apr 2025 16:06)  T(F): 99.1 (25 Apr 2025 23:55), Max: 99.9 (25 Apr 2025 16:06)  HR: 104 (25 Apr 2025 23:55) (98 - 109)  BP: 149/79 (25 Apr 2025 23:55) (138/84 - 171/85)  BP(mean): --  RR: 18 (25 Apr 2025 23:55) (18 - 18)  SpO2: 94% (25 Apr 2025 23:55) (94% - 97%)    Parameters below as of 25 Apr 2025 23:55  Patient On (Oxygen Delivery Method): room air        Physical exam:  Pt is aaox3  Pt in no acute distress  Psych: normal affect  Resp: CTAB  CVS: S1S2(+)  ABD: mild llq ttp, bowel sounds (+), soft, non distended, no rebound, no guarding, no rigidity, no skin changes to exam.   EXT: no calf tenderness or edema to exam b/l, on VTE prophylaxis  Skin: + b/l upper & lower ext and anterior abdominal patchy rashes improved    Labs:                                12.4   19.59 )-----------( 194      ( 26 Apr 2025 06:01 )             38.3     CBC Full  -  ( 26 Apr 2025 06:01 )  WBC Count : 19.59 K/uL  RBC Count : 4.10 M/uL  Hemoglobin : 12.4 g/dL  Hematocrit : 38.3 %  Platelet Count - Automated : 194 K/uL  Mean Cell Volume : 93.4 fl  Mean Cell Hemoglobin : 30.2 pg  Mean Cell Hemoglobin Concentration : 32.4 g/dL  Auto Neutrophil # : 17.10 K/uL  Auto Lymphocyte # : 1.27 K/uL  Auto Monocyte # : 1.03 K/uL  Auto Eosinophil # : 0.02 K/uL  Auto Basophil # : 0.05 K/uL  Auto Neutrophil % : 87.2 %  Auto Lymphocyte % : 6.5 %  Auto Monocyte % : 5.3 %  Auto Eosinophil % : 0.1 %  Auto Basophil % : 0.3 %    04-25    139  |  107  |  7   ----------------------------<  85  3.2[L]   |  26  |  0.61    Ca    9.1      25 Apr 2025 06:35  Phos  1.9     04-25  Mg     1.7     04-25    TPro  6.2  /  Alb  2.8[L]  /  TBili  0.9  /  DBili  x   /  AST  18  /  ALT  30  /  AlkPhos  66  04-25    LIVER FUNCTIONS - ( 25 Apr 2025 06:35 )  Alb: 2.8 g/dL / Pro: 6.2 gm/dL / ALK PHOS: 66 U/L / ALT: 30 U/L / AST: 18 U/L / GGT: x                 Meds:  acetaminophen     Tablet .. 650 milliGRAM(s) Oral every 6 hours  acetaminophen   IVPB .. 1000 milliGRAM(s) IV Intermittent once PRN  diphenhydrAMINE 25 milliGRAM(s) Oral every 6 hours PRN  famotidine Injectable 20 milliGRAM(s) IV Push daily  hydrocortisone 1% Cream 1 Application(s) Topical two times a day  lactobacillus acidophilus 1 Tablet(s) Oral daily  levothyroxine 88 MICROGram(s) Oral daily  losartan 25 milliGRAM(s) Oral daily  ondansetron Injectable 4 milliGRAM(s) IV Push every 6 hours PRN  oxyCODONE    IR 2.5 milliGRAM(s) Oral every 4 hours PRN  oxyCODONE    IR 5 milliGRAM(s) Oral every 4 hours PRN  pantoprazole    Tablet 40 milliGRAM(s) Oral before breakfast  senna 2 Tablet(s) Oral at bedtime PRN  simethicone 80 milliGRAM(s) Chew four times a day  sodium chloride 0.9%. 1000 milliLiter(s) IV Continuous <Continuous>

## 2025-04-27 LAB
ALBUMIN SERPL ELPH-MCNC: 2.3 G/DL — LOW (ref 3.3–5)
ALP SERPL-CCNC: 72 U/L — SIGNIFICANT CHANGE UP (ref 40–120)
ALT FLD-CCNC: 23 U/L — SIGNIFICANT CHANGE UP (ref 12–78)
ANION GAP SERPL CALC-SCNC: 7 MMOL/L — SIGNIFICANT CHANGE UP (ref 5–17)
AST SERPL-CCNC: 17 U/L — SIGNIFICANT CHANGE UP (ref 15–37)
BASOPHILS # BLD AUTO: 0.03 K/UL — SIGNIFICANT CHANGE UP (ref 0–0.2)
BASOPHILS NFR BLD AUTO: 0.2 % — SIGNIFICANT CHANGE UP (ref 0–2)
BILIRUB SERPL-MCNC: 0.7 MG/DL — SIGNIFICANT CHANGE UP (ref 0.2–1.2)
BUN SERPL-MCNC: 6 MG/DL — LOW (ref 7–23)
CALCIUM SERPL-MCNC: 8.5 MG/DL — SIGNIFICANT CHANGE UP (ref 8.5–10.1)
CHLORIDE SERPL-SCNC: 105 MMOL/L — SIGNIFICANT CHANGE UP (ref 96–108)
CO2 SERPL-SCNC: 24 MMOL/L — SIGNIFICANT CHANGE UP (ref 22–31)
CREAT SERPL-MCNC: 0.43 MG/DL — LOW (ref 0.5–1.3)
EGFR: 118 ML/MIN/1.73M2 — SIGNIFICANT CHANGE UP
EGFR: 118 ML/MIN/1.73M2 — SIGNIFICANT CHANGE UP
EOSINOPHIL # BLD AUTO: 0.05 K/UL — SIGNIFICANT CHANGE UP (ref 0–0.5)
EOSINOPHIL NFR BLD AUTO: 0.3 % — SIGNIFICANT CHANGE UP (ref 0–6)
GLUCOSE SERPL-MCNC: 121 MG/DL — HIGH (ref 70–99)
HCT VFR BLD CALC: 36.6 % — SIGNIFICANT CHANGE UP (ref 34.5–45)
HGB BLD-MCNC: 11.9 G/DL — SIGNIFICANT CHANGE UP (ref 11.5–15.5)
IMM GRANULOCYTES # BLD AUTO: 0.1 K/UL — HIGH (ref 0–0.07)
IMM GRANULOCYTES NFR BLD AUTO: 0.6 % — SIGNIFICANT CHANGE UP (ref 0–0.9)
LIDOCAIN IGE QN: 95 U/L — HIGH (ref 13–75)
LYMPHOCYTES # BLD AUTO: 1.24 K/UL — SIGNIFICANT CHANGE UP (ref 1–3.3)
LYMPHOCYTES NFR BLD AUTO: 7.6 % — LOW (ref 13–44)
MAGNESIUM SERPL-MCNC: 2.2 MG/DL — SIGNIFICANT CHANGE UP (ref 1.6–2.6)
MCHC RBC-ENTMCNC: 30 PG — SIGNIFICANT CHANGE UP (ref 27–34)
MCHC RBC-ENTMCNC: 32.5 G/DL — SIGNIFICANT CHANGE UP (ref 32–36)
MCV RBC AUTO: 92.2 FL — SIGNIFICANT CHANGE UP (ref 80–100)
MONOCYTES # BLD AUTO: 0.93 K/UL — HIGH (ref 0–0.9)
MONOCYTES NFR BLD AUTO: 5.7 % — SIGNIFICANT CHANGE UP (ref 2–14)
NEUTROPHILS # BLD AUTO: 14.04 K/UL — HIGH (ref 1.8–7.4)
NEUTROPHILS NFR BLD AUTO: 85.6 % — HIGH (ref 43–77)
NRBC # BLD AUTO: 0 K/UL — SIGNIFICANT CHANGE UP (ref 0–0)
NRBC # FLD: 0 K/UL — SIGNIFICANT CHANGE UP (ref 0–0)
NRBC BLD AUTO-RTO: 0 /100 WBCS — SIGNIFICANT CHANGE UP (ref 0–0)
PHOSPHATE SERPL-MCNC: 1.3 MG/DL — LOW (ref 2.5–4.5)
PLATELET # BLD AUTO: 214 K/UL — SIGNIFICANT CHANGE UP (ref 150–400)
PMV BLD: 11.1 FL — SIGNIFICANT CHANGE UP (ref 7–13)
POTASSIUM SERPL-MCNC: 3 MMOL/L — LOW (ref 3.5–5.3)
POTASSIUM SERPL-SCNC: 3 MMOL/L — LOW (ref 3.5–5.3)
PROT SERPL-MCNC: 6.1 GM/DL — SIGNIFICANT CHANGE UP (ref 6–8.3)
RBC # BLD: 3.97 M/UL — SIGNIFICANT CHANGE UP (ref 3.8–5.2)
RBC # FLD: 12.3 % — SIGNIFICANT CHANGE UP (ref 10.3–14.5)
SODIUM SERPL-SCNC: 136 MMOL/L — SIGNIFICANT CHANGE UP (ref 135–145)
WBC # BLD: 16.39 K/UL — HIGH (ref 3.8–10.5)
WBC # FLD AUTO: 16.39 K/UL — HIGH (ref 3.8–10.5)

## 2025-04-27 PROCEDURE — 99233 SBSQ HOSP IP/OBS HIGH 50: CPT

## 2025-04-27 RX ORDER — POTASSIUM PHOSPHATE, MONOBASIC POTASSIUM PHOSPHATE, DIBASIC INJECTION, 236; 224 MG/ML; MG/ML
30 SOLUTION, CONCENTRATE INTRAVENOUS ONCE
Refills: 0 | Status: COMPLETED | OUTPATIENT
Start: 2025-04-27 | End: 2025-04-27

## 2025-04-27 RX ORDER — ONDANSETRON HCL/PF 4 MG/2 ML
4 VIAL (ML) INJECTION EVERY 6 HOURS
Refills: 0 | Status: DISCONTINUED | OUTPATIENT
Start: 2025-04-27 | End: 2025-04-28

## 2025-04-27 RX ORDER — METOCLOPRAMIDE HCL 10 MG
10 TABLET ORAL EVERY 6 HOURS
Refills: 0 | Status: DISCONTINUED | OUTPATIENT
Start: 2025-04-27 | End: 2025-04-28

## 2025-04-27 RX ADMIN — Medication 80 MILLIGRAM(S): at 23:56

## 2025-04-27 RX ADMIN — Medication 20 MILLIGRAM(S): at 10:17

## 2025-04-27 RX ADMIN — LOSARTAN POTASSIUM 25 MILLIGRAM(S): 100 TABLET, FILM COATED ORAL at 10:17

## 2025-04-27 RX ADMIN — MEROPENEM 1000 MILLIGRAM(S): 1 INJECTION INTRAVENOUS at 16:21

## 2025-04-27 RX ADMIN — Medication 65 MILLILITER(S): at 14:53

## 2025-04-27 RX ADMIN — Medication 1 TABLET(S): at 10:16

## 2025-04-27 RX ADMIN — Medication 4 MILLIGRAM(S): at 17:21

## 2025-04-27 RX ADMIN — Medication 650 MILLIGRAM(S): at 23:54

## 2025-04-27 RX ADMIN — Medication 80 MILLIGRAM(S): at 13:40

## 2025-04-27 RX ADMIN — Medication 40 MILLIGRAM(S): at 10:21

## 2025-04-27 RX ADMIN — POTASSIUM PHOSPHATE, MONOBASIC POTASSIUM PHOSPHATE, DIBASIC INJECTION, 83.33 MILLIMOLE(S): 236; 224 SOLUTION, CONCENTRATE INTRAVENOUS at 10:39

## 2025-04-27 RX ADMIN — Medication 10 MILLIGRAM(S): at 12:14

## 2025-04-27 RX ADMIN — Medication 650 MILLIGRAM(S): at 12:16

## 2025-04-27 RX ADMIN — Medication 10 MILLIGRAM(S): at 18:28

## 2025-04-27 RX ADMIN — MEROPENEM 1000 MILLIGRAM(S): 1 INJECTION INTRAVENOUS at 06:16

## 2025-04-27 RX ADMIN — Medication 25 MILLIGRAM(S): at 05:13

## 2025-04-27 RX ADMIN — Medication 4 MILLIGRAM(S): at 10:18

## 2025-04-27 RX ADMIN — Medication 88 MICROGRAM(S): at 06:17

## 2025-04-27 RX ADMIN — Medication 40 MILLIEQUIVALENT(S): at 13:40

## 2025-04-27 RX ADMIN — Medication 650 MILLIGRAM(S): at 06:17

## 2025-04-27 NOTE — PROGRESS NOTE ADULT - SUBJECTIVE AND OBJECTIVE BOX
CC:Patient is a 49y old  Female who presents with a chief complaint of abdominal pain (25 Apr 2025 15:34)      Subjective:  Pt seen and examined at bedside this morning, had x1 episode of emesis yesterday and spiked fever (x2). Was tolerating diet;    ROS:  otherwise as abovementioned ROS    Vital Signs Last 24 Hrs  T(C): 37.3 (25 Apr 2025 23:55), Max: 37.7 (25 Apr 2025 16:06)  T(F): 99.1 (25 Apr 2025 23:55), Max: 99.9 (25 Apr 2025 16:06)  HR: 104 (25 Apr 2025 23:55) (98 - 109)  BP: 149/79 (25 Apr 2025 23:55) (138/84 - 171/85)  BP(mean): --  RR: 18 (25 Apr 2025 23:55) (18 - 18)  SpO2: 94% (25 Apr 2025 23:55) (94% - 97%)    Parameters below as of 25 Apr 2025 23:55  Patient On (Oxygen Delivery Method): room air        Physical exam:  Pt is aaox3  Pt in no acute distress  Psych: normal affect  Resp: CTAB  CVS: S1S2(+)  ABD: TTP in epigastrum and RUQ, bowel sounds (+), soft, non distended, no rebound, no guarding, no rigidity, no skin changes to exam.   EXT: no calf tenderness or edema to exam b/l, on VTE prophylaxis  Skin: + b/l upper & lower ext and anterior abdominal patchy rashes improved    Labs:                                12.4   19.59 )-----------( 194      ( 26 Apr 2025 06:01 )             38.3     CBC Full  -  ( 26 Apr 2025 06:01 )  WBC Count : 19.59 K/uL  RBC Count : 4.10 M/uL  Hemoglobin : 12.4 g/dL  Hematocrit : 38.3 %  Platelet Count - Automated : 194 K/uL  Mean Cell Volume : 93.4 fl  Mean Cell Hemoglobin : 30.2 pg  Mean Cell Hemoglobin Concentration : 32.4 g/dL  Auto Neutrophil # : 17.10 K/uL  Auto Lymphocyte # : 1.27 K/uL  Auto Monocyte # : 1.03 K/uL  Auto Eosinophil # : 0.02 K/uL  Auto Basophil # : 0.05 K/uL  Auto Neutrophil % : 87.2 %  Auto Lymphocyte % : 6.5 %  Auto Monocyte % : 5.3 %  Auto Eosinophil % : 0.1 %  Auto Basophil % : 0.3 %    04-25    139  |  107  |  7   ----------------------------<  85  3.2[L]   |  26  |  0.61    Ca    9.1      25 Apr 2025 06:35  Phos  1.9     04-25  Mg     1.7     04-25    TPro  6.2  /  Alb  2.8[L]  /  TBili  0.9  /  DBili  x   /  AST  18  /  ALT  30  /  AlkPhos  66  04-25    LIVER FUNCTIONS - ( 25 Apr 2025 06:35 )  Alb: 2.8 g/dL / Pro: 6.2 gm/dL / ALK PHOS: 66 U/L / ALT: 30 U/L / AST: 18 U/L / GGT: x                 Meds:  acetaminophen     Tablet .. 650 milliGRAM(s) Oral every 6 hours  acetaminophen   IVPB .. 1000 milliGRAM(s) IV Intermittent once PRN  diphenhydrAMINE 25 milliGRAM(s) Oral every 6 hours PRN  famotidine Injectable 20 milliGRAM(s) IV Push daily  hydrocortisone 1% Cream 1 Application(s) Topical two times a day  lactobacillus acidophilus 1 Tablet(s) Oral daily  levothyroxine 88 MICROGram(s) Oral daily  losartan 25 milliGRAM(s) Oral daily  ondansetron Injectable 4 milliGRAM(s) IV Push every 6 hours PRN  oxyCODONE    IR 2.5 milliGRAM(s) Oral every 4 hours PRN  oxyCODONE    IR 5 milliGRAM(s) Oral every 4 hours PRN  pantoprazole    Tablet 40 milliGRAM(s) Oral before breakfast  senna 2 Tablet(s) Oral at bedtime PRN  simethicone 80 milliGRAM(s) Chew four times a day  sodium chloride 0.9%. 1000 milliLiter(s) IV Continuous <Continuous>

## 2025-04-27 NOTE — PROGRESS NOTE ADULT - SUBJECTIVE AND OBJECTIVE BOX
Patient is a 49y old  Female who presents with a chief complaint of abdominal pain (2025 09:43)      Subective:  Vomited x 2 today but pain mostly resolved    PAST MEDICAL & SURGICAL HISTORY:  HLD (hyperlipidemia)      CAD (coronary artery disease)      HTN (hypertension)      History of Hashimoto thyroiditis      H/O  section      History of endometrial ablation          MEDICATIONS  (STANDING):  acetaminophen     Tablet .. 650 milliGRAM(s) Oral every 6 hours  hydrocortisone 1% Cream 1 Application(s) Topical two times a day  lactobacillus acidophilus 1 Tablet(s) Oral daily  levothyroxine 88 MICROGram(s) Oral daily  losartan 25 milliGRAM(s) Oral daily  meropenem Injectable 1000 milliGRAM(s) IV Push every 8 hours  pantoprazole  Injectable 40 milliGRAM(s) IV Push daily  potassium chloride    Tablet ER 40 milliEquivalent(s) Oral once  simethicone 80 milliGRAM(s) Chew four times a day  sodium chloride 0.9%. 1000 milliLiter(s) (65 mL/Hr) IV Continuous <Continuous>    MEDICATIONS  (PRN):  acetaminophen   IVPB .. 1000 milliGRAM(s) IV Intermittent once PRN Mild Pain (1 - 3)  diphenhydrAMINE 25 milliGRAM(s) Oral every 6 hours PRN Rash and/or Itching  metoclopramide Injectable 10 milliGRAM(s) IV Push every 6 hours PRN for nausea and/or vomiting  ondansetron Injectable 4 milliGRAM(s) IV Push every 6 hours PRN for nauea and/or vomiting refractory to reglan  oxyCODONE    IR 2.5 milliGRAM(s) Oral every 4 hours PRN Moderate Pain (4 - 6)  oxyCODONE    IR 5 milliGRAM(s) Oral every 4 hours PRN Severe Pain (7 - 10)  senna 2 Tablet(s) Oral at bedtime PRN Constipation      REVIEW OF SYSTEMS:    RESPIRATORY: No shortness of breath  CARDIOVASCULAR: No chest pain  All other review of systems is negative unless indicated above.    Vital Signs Last 24 Hrs  T(C): 37.4 (2025 08:07), Max: 38.2 (2025 16:00)  T(F): 99.3 (2025 08:07), Max: 100.8 (2025 16:00)  HR: 77 (2025 08:07) (77 - 95)  BP: 135/78 (2025 08:07) (135/78 - 146/72)  BP(mean): --  RR: 18 (2025 08:07) (18 - 18)  SpO2: 94% (2025 08:07) (94% - 96%)    Parameters below as of 2025 08:07  Patient On (Oxygen Delivery Method): room air        PHYSICAL EXAM:    Constitutional: NAD, well-developed  Respiratory: CTAB  Cardiovascular: S1 and S2, RRR  Gastrointestinal: BS+, soft, NT/ND  Extremities: No peripheral edema  Psychiatric: Normal mood, normal affect    LABS:                        11.9   16.39 )-----------( 214      ( 2025 06:06 )             36.6         136  |  105  |  6[L]  ----------------------------<  121[H]  3.0[L]   |  24  |  0.43[L]    Ca    8.5      2025 06:06  Phos  1.3       Mg     2.2         TPro  6.1  /  Alb  2.3[L]  /  TBili  0.7  /  DBili  x   /  AST  17  /  ALT  23  /  AlkPhos  72        LIVER FUNCTIONS - ( 2025 06:06 )  Alb: 2.3 g/dL / Pro: 6.1 gm/dL / ALK PHOS: 72 U/L / ALT: 23 U/L / AST: 17 U/L / GGT: x             RADIOLOGY & ADDITIONAL STUDIES:

## 2025-04-27 NOTE — PROGRESS NOTE ADULT - ASSESSMENT
49-year-old female s/p pancreatic biopsy via EUS with pancreatitis presents to  ED with CC of Abd pain. Work-up in ED notable for lipase > 3000 and non-contrast CT findings of pancreatitis and heladio-pancreatic collection of blood.    #Pancreatitis  #Heladio-pancreatic collection  #Rash improved    Plan:  cont diet  Started on meropenem  Trend WBC  IV fluids  FU GI reccs  Serial abdominal exams  PRN analgesic and antiemetic therapy  GI ppx  / DVT ppx (SCD only)  PRN Benadryl   -Trial of topical Hydrocortisone 1%  -Hypoallergenic Linens    -Cont to monitor to evolving clinical changes    f/u Hospitalist consultation reccs    Case and plan discussed with surgical attending

## 2025-04-27 NOTE — PROGRESS NOTE ADULT - ASSESSMENT
MEDICATIONS  (STANDING):  acetaminophen     Tablet .. 650 milliGRAM(s) Oral every 6 hours  famotidine Injectable 20 milliGRAM(s) IV Push daily  hydrocortisone 1% Cream 1 Application(s) Topical two times a day  lactobacillus acidophilus 1 Tablet(s) Oral daily  levothyroxine 88 MICROGram(s) Oral daily  losartan 25 milliGRAM(s) Oral daily  meropenem Injectable 1000 milliGRAM(s) IV Push every 8 hours  pantoprazole    Tablet 40 milliGRAM(s) Oral before breakfast  potassium chloride   Powder 40 milliEquivalent(s) Oral once  potassium phosphate IVPB 30 milliMole(s) IV Intermittent once  simethicone 80 milliGRAM(s) Chew four times a day  sodium chloride 0.9%. 1000 milliLiter(s) (65 mL/Hr) IV Continuous <Continuous>    MEDICATIONS  (PRN):  acetaminophen   IVPB .. 1000 milliGRAM(s) IV Intermittent once PRN Mild Pain (1 - 3)  diphenhydrAMINE 25 milliGRAM(s) Oral every 6 hours PRN Rash and/or Itching  ondansetron Injectable 4 milliGRAM(s) IV Push every 6 hours PRN Nausea  oxyCODONE    IR 2.5 milliGRAM(s) Oral every 4 hours PRN Moderate Pain (4 - 6)  oxyCODONE    IR 5 milliGRAM(s) Oral every 4 hours PRN Severe Pain (7 - 10)  senna 2 Tablet(s) Oral at bedtime PRN Constipation      49-year-old female with PMH of HTN, HLD, Hypothyroidism, cholelithiasis with later incidental pancreatic cyst that MRI suggested it can be a neuroendocrine tumor for which she underwent pancreatic biopsy via EUS  on 4/23/25 . She states in the afternoon she experienced abdominal pain and vomiting. Denies fever or chills, SOB, chest pain  Developed progressively worsening abdominal pain with vomiting.  Sought medical attention in the ED.  Work-up in ED notable for lipase > 3000 and non-contrast CT findings of pancreatitis and heladio-pancreatic collection of blood.  Medical team consulted for medical management       Assessment/Plan:    Diffuse abdominal pain due to s/p pancreatic lesion biopsy with subsequent post procedure pancreatitis and likely self-limited post-biopsy bleeding, improved   Associated Leukocytosis  -No sepsis POA. Isolated Leukocytosis. SIRS/Sepsis onset 4/25 with associated tachycardia with persistent leukocytosis and onset of fevers tmax 100.8 4/26  - CT abd Peripancreatic fluid and stranding compatible with pancreatitis. Small to  moderate volume hemorrhage noted anterior to the pancreas, between the   pancreas and stomach and extending to the pericholecystic region. Small  volume hemoperitoneum also noted in the pelvis.  -Lipase 3000s->95  - Management as per surgery team   - GI attending If pt becomes hemodynamically unstable, then would likely need STAT CTA to assess for further bleeding  - diet as per surgery   - IVF , PPI   - blood cx x2 neg   - Empiric IV antibiotics  - pain control PRN, pt advised to limit narcotics if possible  - Associated electrolyte abnormalities. COntinue to monitor electrolytes/cr and replace electrolytes accordingly. Cr improved to 0.43 from 0.88 on admission indicating likely with PRASHANTH from hypovolemia POA. Continue to monitor.       Constipation, Bloating   -  repeat suppository for constipation   - c/w senna  - simethicone  - probiotics    HTN  - c/w losartan    Hypothyroidism  - c/w levothyroxine    Rash likely due to IV abx   - s/p IV cipro and Flagyl   - c/w IV /PO pepcid for 2 more days  - benadryl prn  - antibiotics switched to merrem      DVT PPX: as per surgery, no heparin due to bleed     Advance Directive previously discussed.   - Full code, diagnosis, prognosis discussed  - HCP  Manolo [phone]    Disposition: as per surgery; Empiric antibotics. Diet advanced as per surgery. Continue to monitor electrolytes and replace accordingly.

## 2025-04-27 NOTE — PROGRESS NOTE ADULT - SUBJECTIVE AND OBJECTIVE BOX
Chief Complaint: abdominal pain, rash    HPI:       49-year-old female with PMH of HTN, HLD, Hypothyroidism, cholelithiasis with later incidental pancreatic cyst that MRI suggested it can be a neuroendocrine tumor for which she underwent pancreatic biopsy via EUS  on 4/23/25 . She states in the afternoon she experienced abdominal pain and vomiting. Denies fever or chills, SOB, chest pain  Developed progressively worsening abdominal pain with vomiting.  Sought medical attention in the ED.  Work-up in ED notable for lipase > 3000 and non-contrast CT findings of pancreatitis and heladio-pancreatic collection of blood.  Medical team consulted for medical management     4/25 - pt seen and examined, denies cp, dyspnea,  cough, + rash in am, all IV abx, IV fluids , rash almost completely resolved after IV benadryl IV pepcid, denies nausea, vomiting, lower abdominal pain better, upper abdominal pain still persist, afebrile, plan discussed   4/26 - abdominal pain minimal 2/10, tolerating some po intake + flatus, no BM, denies nausea, vomiting, no rash, tolerating IV fluids, less bloating after simethicone  4/27: Tmax 100.8. Other vitals stable. COntinue antibiotics. Potassium and phosphorus low. Replace and continue to monitor.     Review of Systems: 14 Point review of systems reviewed and reported as negative unless otherwise stated above    PHYSICAL EXAM:    T(C): 37.4 (04-27-25 @ 08:07), Max: 38.2 (04-26-25 @ 16:00)  HR: 77 (04-27-25 @ 08:07) (77 - 95)  BP: 135/78 (04-27-25 @ 08:07) (135/78 - 158/78)  RR: 18 (04-27-25 @ 08:07) (18 - 18)  SpO2: 94% (04-27-25 @ 08:07) (94% - 96%)      Constitutional: NAD, awake and alert  HEENT: PERR, EOMI, Normal Hearing, MMM  Neck: Soft and supple, No LAD, No JVD  Respiratory: Breath sounds are clear bilaterally, No wheezing, rales or rhonchi  Cardiovascular: S1 and S2, regular rate and rhythm, no Murmurs, gallops or rubs  Gastrointestinal: Bowel Sounds present, soft, nontender, nondistended, no guarding, no rebound  Extremities: No peripheral edema  Vascular: 2+ peripheral pulses  Neurological: A/O x 3, no focal deficits  Musculoskeletal: 5/5 strength b/l upper and lower extremities  Skin: No rashes        Labs: All Labs Reviewed:                          11.9   16.39 )-----------( 214      ( 27 Apr 2025 06:06 )             36.6     04-27    136  |  105  |  6[L]  ----------------------------<  121[H]  3.0[L]   |  24  |  0.43[L]    Ca    8.5      27 Apr 2025 06:06  Phos  1.3     04-27  Mg     2.2     04-27    TPro  6.1  /  Alb  2.3[L]  /  TBili  0.7  /  DBili  x   /  AST  17  /  ALT  23  /  AlkPhos  72  04-27      CAPILLARY BLOOD GLUCOSE      POCT Blood Glucose.: 96 mg/dL (26 Apr 2025 12:01)    Lipase: 95 U/L (04.27.25 @ 06:06)   Lipase: 110 U/L (04.26.25 @ 06:01)   Lipase: 477 U/L (04.25.25 @ 06:35)   Lipase: 1446 U/L (04.24.25 @ 06:57)   Lipase: 3225 U/L (04.23.25 @ 22:15) C-Reactive Protein (04.26.25 @ 06:01)   C-Reactive Protein: 267.0 mg/L    RADIOLOGY:    < from: CT Abdomen and Pelvis No Cont (04.23.25 @ 23:43) >  IMPRESSION:  Exam limited by noncontrast technique.  Peripancreatic fluid and stranding compatible with pancreatitis. Small to   moderate volume hemorrhage noted anterior to the pancreas, between the   pancreas and stomach and extending to the pericholecystic region. Small   volume hemoperitoneum also noted in the pelvis.    Partially imaged small groundglass opacities in the lingula are   nonspecific, could be infectious or inflammatory in etiology.    < end of copied text >      Xray Chest 1 View- PORTABLE-Urgent (Xray Chest 1 View- PORTABLE-Urgent .) (04.24.25 @ 08:44) >  IMPRESSION:  No active parenchymal disease in the chest.      RADIOLOGY/EKG: all reviewed     < from: 12 Lead ECG (04.23.25 @ 08:04) >  Diagnosis Line Normal sinus rhythm  Normal ECG  No previous ECGs available    < end of copied text >

## 2025-04-27 NOTE — PROGRESS NOTE ADULT - ASSESSMENT
Imp:  Pancreatitis and bleed after EUS/FNA    Rec:  My inclination is to stop abx in absence of clear evidence of infection  Cont supportive care today, d/c when reliably tolerating POs

## 2025-04-28 ENCOUNTER — TRANSCRIPTION ENCOUNTER (OUTPATIENT)
Age: 50
End: 2025-04-28

## 2025-04-28 VITALS
TEMPERATURE: 98 F | SYSTOLIC BLOOD PRESSURE: 170 MMHG | RESPIRATION RATE: 17 BRPM | DIASTOLIC BLOOD PRESSURE: 75 MMHG | OXYGEN SATURATION: 97 % | HEART RATE: 77 BPM

## 2025-04-28 LAB
ALBUMIN SERPL ELPH-MCNC: 2.4 G/DL — LOW (ref 3.3–5)
ALP SERPL-CCNC: 76 U/L — SIGNIFICANT CHANGE UP (ref 40–120)
ALT FLD-CCNC: 24 U/L — SIGNIFICANT CHANGE UP (ref 12–78)
ANION GAP SERPL CALC-SCNC: 11 MMOL/L — SIGNIFICANT CHANGE UP (ref 5–17)
AST SERPL-CCNC: 22 U/L — SIGNIFICANT CHANGE UP (ref 15–37)
BASOPHILS # BLD AUTO: 0.05 K/UL — SIGNIFICANT CHANGE UP (ref 0–0.2)
BASOPHILS NFR BLD AUTO: 0.3 % — SIGNIFICANT CHANGE UP (ref 0–2)
BILIRUB SERPL-MCNC: 0.7 MG/DL — SIGNIFICANT CHANGE UP (ref 0.2–1.2)
BUN SERPL-MCNC: 9 MG/DL — SIGNIFICANT CHANGE UP (ref 7–23)
CALCIUM SERPL-MCNC: 8.7 MG/DL — SIGNIFICANT CHANGE UP (ref 8.5–10.1)
CHLORIDE SERPL-SCNC: 106 MMOL/L — SIGNIFICANT CHANGE UP (ref 96–108)
CO2 SERPL-SCNC: 22 MMOL/L — SIGNIFICANT CHANGE UP (ref 22–31)
CREAT SERPL-MCNC: 0.46 MG/DL — LOW (ref 0.5–1.3)
EGFR: 116 ML/MIN/1.73M2 — SIGNIFICANT CHANGE UP
EGFR: 116 ML/MIN/1.73M2 — SIGNIFICANT CHANGE UP
EOSINOPHIL # BLD AUTO: 0.14 K/UL — SIGNIFICANT CHANGE UP (ref 0–0.5)
EOSINOPHIL NFR BLD AUTO: 0.9 % — SIGNIFICANT CHANGE UP (ref 0–6)
GLUCOSE SERPL-MCNC: 105 MG/DL — HIGH (ref 70–99)
HCT VFR BLD CALC: 40.2 % — SIGNIFICANT CHANGE UP (ref 34.5–45)
HGB BLD-MCNC: 13.1 G/DL — SIGNIFICANT CHANGE UP (ref 11.5–15.5)
IMM GRANULOCYTES # BLD AUTO: 0.12 K/UL — HIGH (ref 0–0.07)
IMM GRANULOCYTES NFR BLD AUTO: 0.8 % — SIGNIFICANT CHANGE UP (ref 0–0.9)
LIDOCAIN IGE QN: 51 U/L — SIGNIFICANT CHANGE UP (ref 13–75)
LYMPHOCYTES # BLD AUTO: 1.87 K/UL — SIGNIFICANT CHANGE UP (ref 1–3.3)
LYMPHOCYTES NFR BLD AUTO: 12.1 % — LOW (ref 13–44)
MAGNESIUM SERPL-MCNC: 2.2 MG/DL — SIGNIFICANT CHANGE UP (ref 1.6–2.6)
MCHC RBC-ENTMCNC: 29.8 PG — SIGNIFICANT CHANGE UP (ref 27–34)
MCHC RBC-ENTMCNC: 32.6 G/DL — SIGNIFICANT CHANGE UP (ref 32–36)
MCV RBC AUTO: 91.4 FL — SIGNIFICANT CHANGE UP (ref 80–100)
MONOCYTES # BLD AUTO: 1.12 K/UL — HIGH (ref 0–0.9)
MONOCYTES NFR BLD AUTO: 7.2 % — SIGNIFICANT CHANGE UP (ref 2–14)
NEUTROPHILS # BLD AUTO: 12.21 K/UL — HIGH (ref 1.8–7.4)
NEUTROPHILS NFR BLD AUTO: 78.7 % — HIGH (ref 43–77)
NRBC # BLD AUTO: 0 K/UL — SIGNIFICANT CHANGE UP (ref 0–0)
NRBC # FLD: 0 K/UL — SIGNIFICANT CHANGE UP (ref 0–0)
NRBC BLD AUTO-RTO: 0 /100 WBCS — SIGNIFICANT CHANGE UP (ref 0–0)
PHOSPHATE SERPL-MCNC: 1.7 MG/DL — LOW (ref 2.5–4.5)
PLATELET # BLD AUTO: 248 K/UL — SIGNIFICANT CHANGE UP (ref 150–400)
PMV BLD: 10.4 FL — SIGNIFICANT CHANGE UP (ref 7–13)
POTASSIUM SERPL-MCNC: 3.2 MMOL/L — LOW (ref 3.5–5.3)
POTASSIUM SERPL-SCNC: 3.2 MMOL/L — LOW (ref 3.5–5.3)
PROT SERPL-MCNC: 6.4 GM/DL — SIGNIFICANT CHANGE UP (ref 6–8.3)
RBC # BLD: 4.4 M/UL — SIGNIFICANT CHANGE UP (ref 3.8–5.2)
RBC # FLD: 12.3 % — SIGNIFICANT CHANGE UP (ref 10.3–14.5)
SODIUM SERPL-SCNC: 139 MMOL/L — SIGNIFICANT CHANGE UP (ref 135–145)
WBC # BLD: 15.51 K/UL — HIGH (ref 3.8–10.5)
WBC # FLD AUTO: 15.51 K/UL — HIGH (ref 3.8–10.5)

## 2025-04-28 PROCEDURE — 99233 SBSQ HOSP IP/OBS HIGH 50: CPT

## 2025-04-28 PROCEDURE — 99238 HOSP IP/OBS DSCHRG MGMT 30/<: CPT

## 2025-04-28 PROCEDURE — 99232 SBSQ HOSP IP/OBS MODERATE 35: CPT

## 2025-04-28 RX ORDER — OXYCODONE HYDROCHLORIDE AND ACETAMINOPHEN 10; 325 MG/1; MG/1
1 TABLET ORAL
Qty: 16 | Refills: 0
Start: 2025-04-28 | End: 2025-05-01

## 2025-04-28 RX ORDER — IBUPROFEN 200 MG
1 TABLET ORAL
Refills: 0 | DISCHARGE

## 2025-04-28 RX ORDER — SOD PHOS DI, MONO/K PHOS MONO 250 MG
1 TABLET ORAL ONCE
Refills: 0 | Status: COMPLETED | OUTPATIENT
Start: 2025-04-28 | End: 2025-04-28

## 2025-04-28 RX ADMIN — Medication 88 MICROGRAM(S): at 05:42

## 2025-04-28 RX ADMIN — LOSARTAN POTASSIUM 25 MILLIGRAM(S): 100 TABLET, FILM COATED ORAL at 10:40

## 2025-04-28 RX ADMIN — Medication 40 MILLIGRAM(S): at 12:00

## 2025-04-28 RX ADMIN — Medication 10 MILLIGRAM(S): at 08:48

## 2025-04-28 RX ADMIN — Medication 80 MILLIGRAM(S): at 13:33

## 2025-04-28 RX ADMIN — Medication 650 MILLIGRAM(S): at 05:42

## 2025-04-28 RX ADMIN — Medication 1 PACKET(S): at 10:40

## 2025-04-28 RX ADMIN — Medication 1 TABLET(S): at 10:40

## 2025-04-28 RX ADMIN — Medication 80 MILLIGRAM(S): at 05:42

## 2025-04-28 RX ADMIN — Medication 65 MILLILITER(S): at 06:24

## 2025-04-28 NOTE — DISCHARGE NOTE PROVIDER - HOSPITAL COURSE
49-year-old female with PMHx of HTN, HLD, Hypothyroidism, cholelithiasis, and pancreatic cyst (MRI suggestive of neuroendocrine tumor for which she underwent pancreatic biopsy via EUS  on 4/23/25) present to  ED with abdominal pain and vomiting.  Work-up in ED notable for lipase > 3000 and non-contrast CT findings of pancreatitis and heladio-pancreatic collection of blood. Hospital course noted for allergic rash (proximate cause likely reaction to IV Cipro / Flagyl per medicine team) - resolved with benadryl / H2 blocker and leukocytosis/febrile - short course Meropenum therapy.  Lipase levels downtrending and tolerating PO intake. Pt currently in NAD and without acute complaints. Denies N/V/D HA, dizziness, blurry vision, numbness/tingling, SOB, cough, chest pain, palpitations, abd pain or urinary sx's. Pt seen and evaluated by surgical attending, deemed clear for d/c home today.                          13.1   15.51 )-----------( 248      ( 28 Apr 2025 06:53 )             40.2     04-28    139  |  106  |  9   ----------------------------<  105[H]  3.2[L]   |  22  |  0.46[L]    Ca    8.7      28 Apr 2025 06:53  Phos  1.7     04-28  Mg     2.2     04-28    TPro  6.4  /  Alb  2.4[L]  /  TBili  0.7  /  DBili  x   /  AST  22  /  ALT  24  /  AlkPhos  76  04-28    CT FINDINGS:  LOWER CHEST: Partially imaged nodular and patchy groundglass opacities in   the lingula.  LIVER: Patchy steatosis. Multilobulated partially exophytic left hepatic   lobe cyst.            49-year-old female with PMHx of HTN, HLD, Hypothyroidism, cholelithiasis, and pancreatic cyst (MRI suggestive of neuroendocrine tumor for which she underwent pancreatic biopsy via EUS  on 4/23/25) present to  ED with abdominal pain and vomiting.  Work-up in ED notable for lipase > 3000 and non-contrast CT findings of pancreatitis and heladio-pancreatic collection of blood. Hospital course noted for allergic rash (proximate cause likely reaction to IV Cipro / Flagyl per medicine team) - resolved with benadryl / H2 blocker, and leukocytosis/febrile - received short course Meropenum therapy (Pt refusing rec duration of ABX therapy at this time, received x4 doses of Meropenum).  Lipase levels downtrending and tolerating PO intake. Pt currently in NAD and without acute complaints. Denies N/V/D HA, dizziness, blurry vision, numbness/tingling, SOB, cough, chest pain, palpitations, abd pain or urinary sx's. Pt seen and evaluated by surgical attending, deemed clear for d/c home today.                          13.1   15.51 )-----------( 248      ( 28 Apr 2025 06:53 )             40.2     04-28    139  |  106  |  9   ----------------------------<  105[H]  3.2[L]   |  22  |  0.46[L]    Ca    8.7      28 Apr 2025 06:53  Phos  1.7     04-28  Mg     2.2     04-28    TPro  6.4  /  Alb  2.4[L]  /  TBili  0.7  /  DBili  x   /  AST  22  /  ALT  24  /  AlkPhos  76  04-28    CT FINDINGS:  LOWER CHEST: Partially imaged nodular and patchy groundglass opacities in   the lingula.  LIVER: Patchy steatosis. Multilobulated partially exophytic left hepatic   lobe cyst.

## 2025-04-28 NOTE — DISCHARGE NOTE PROVIDER - CARE PROVIDER_API CALL
Gus Griffith  Gastroenterology  [address] [place] [id number]  Phone: [phone]  Fax: [phone]  Follow Up Time: 1 week

## 2025-04-28 NOTE — PROGRESS NOTE ADULT - ASSESSMENT
48 YO F w/hx of pancreatic solid lesion (sub-cm) and pancreatic cyst (sub-cm), s/p EUS/core biopsy of pancreatic mass on 4/23/25.  Developed progressively worsening abdominal pain with vomiting.  Sought medical attention in the ED.  Work-up in ED notable for lipase > 3000 and non-contrast CT findings of pancreatitis and heladio-pancreatic collection of blood.    Events of weekend noted (low grade fever).  Currently pt is stable.  Abdominal/pelvic pain resolved.  Exam benign.  wbc trending down.    Clinical picture c/w post-needle biopsy pancreatitis and likely self-limited post-biopsy bleeding.  Clinically stable with resolved pain.  Down-trending leukocytosis, likely from inflammatory process.  Had low grade temp x 1 over weekend, but no infectious signs or symptoms.  ? secondary to drug allergy    Recs:  - from GI standpoint, would favor discharge to home today if continues to tolerate PO diet.  - Will defer antibiotic management to surgical team, though I do not feel strongly that she requires any further ABX at this time.  - I will contact pt once pancreatic pathology results have returned.

## 2025-04-28 NOTE — DISCHARGE NOTE NURSING/CASE MANAGEMENT/SOCIAL WORK - FINANCIAL ASSISTANCE
[facility] provides services at a reduced cost to those who are determined to be eligible through [facility]’s financial assistance program. Information regarding [facility]’s financial assistance program can be found by going to [email or web address] or by calling [phone].

## 2025-04-28 NOTE — PROGRESS NOTE ADULT - SUBJECTIVE AND OBJECTIVE BOX
HOSPITALIST ATTENDING PROGRESS NOTE    Chart and meds reviewed.  Patient seen and examined.    CC: roslyn pain    Subjective: Pt feeling much improved, hopeful for d/c.     All other systems reviewed and found to be negative with the exception of what has been described above.    MEDICATIONS  (STANDING):  acetaminophen     Tablet .. 650 milliGRAM(s) Oral every 6 hours  hydrocortisone 1% Cream 1 Application(s) Topical two times a day  lactobacillus acidophilus 1 Tablet(s) Oral daily  levothyroxine 88 MICROGram(s) Oral daily  losartan 25 milliGRAM(s) Oral daily  meropenem Injectable 1000 milliGRAM(s) IV Push every 8 hours  pantoprazole  Injectable 40 milliGRAM(s) IV Push daily  simethicone 80 milliGRAM(s) Chew four times a day  sodium chloride 0.9%. 1000 milliLiter(s) (65 mL/Hr) IV Continuous <Continuous>    MEDICATIONS  (PRN):  acetaminophen   IVPB .. 1000 milliGRAM(s) IV Intermittent once PRN Mild Pain (1 - 3)  diphenhydrAMINE 25 milliGRAM(s) Oral every 6 hours PRN Rash and/or Itching  metoclopramide Injectable 10 milliGRAM(s) IV Push every 6 hours PRN for nausea and/or vomiting  ondansetron Injectable 4 milliGRAM(s) IV Push every 6 hours PRN for nauea and/or vomiting refractory to reglan  oxyCODONE    IR 2.5 milliGRAM(s) Oral every 4 hours PRN Moderate Pain (4 - 6)  oxyCODONE    IR 5 milliGRAM(s) Oral every 4 hours PRN Severe Pain (7 - 10)  senna 2 Tablet(s) Oral at bedtime PRN Constipation      VITALS:  T(F): 98.5 (04-28-25 @ 07:51), Max: 98.7 (04-28-25 @ 00:18)  HR: 77 (04-28-25 @ 07:51) (77 - 81)  BP: 170/75 (04-28-25 @ 07:51) (132/67 - 170/75)  RR: 17 (04-28-25 @ 07:51) (17 - 18)  SpO2: 97% (04-28-25 @ 07:51) (97% - 98%)  Wt(kg): --    I&O's Summary      CAPILLARY BLOOD GLUCOSE          PHYSICAL EXAM:  Gen: NAD  HEENT:  pupils equal and reactive, EOMI, no oropharyngeal lesions, erythema, exudates, oral thrush  NECK:   supple, no carotid bruits, no palpable lymph nodes, no thyromegaly  CV:  +S1, +S2, regular, no murmurs or rubs  RESP:   lungs clear to auscultation bilaterally, no wheezing, rales, rhonchi, good air entry bilaterally  BREAST:  not examined  GI:  abdomen soft, non-tender, non-distended, normal BS, no bruits, no abdominal masses, no palpable masses  RECTAL:  not examined  :  not examined  MSK:   normal muscle tone, no atrophy, no rigidity, no contractions  EXT:  no clubbing, no cyanosis, no edema, no calf pain, swelling or erythema  VASCULAR:  pulses equal and symmetric in the upper and lower extremities  NEURO:  AAOX3, no focal neurological deficits, follows all commands, able to move extremities spontaneously  SKIN:  no ulcers, lesions or rashes    LABS:                            13.1   15.51 )-----------( 248      ( 28 Apr 2025 06:53 )             40.2     04-28    139  |  106  |  9   ----------------------------<  105[H]  3.2[L]   |  22  |  0.46[L]    Ca    8.7      28 Apr 2025 06:53  Phos  1.7     04-28  Mg     2.2     04-28    TPro  6.4  /  Alb  2.4[L]  /  TBili  0.7  /  DBili  x   /  AST  22  /  ALT  24  /  AlkPhos  76  04-28        LIVER FUNCTIONS - ( 28 Apr 2025 06:53 )  Alb: 2.4 g/dL / Pro: 6.4 gm/dL / ALK PHOS: 76 U/L / ALT: 24 U/L / AST: 22 U/L / GGT: x             Urinalysis Basic - ( 28 Apr 2025 06:53 )    Color: x / Appearance: x / SG: x / pH: x  Gluc: 105 mg/dL / Ketone: x  / Bili: x / Urobili: x   Blood: x / Protein: x / Nitrite: x   Leuk Esterase: x / RBC: x / WBC x   Sq Epi: x / Non Sq Epi: x / Bacteria: x    CULTURES:  no new    Additional results/Imaging, I have personally reviewed:  no new

## 2025-04-28 NOTE — DISCHARGE NOTE PROVIDER - NSDCMRMEDTOKEN_GEN_ALL_CORE_FT
Align 4 mg oral capsule: 1 cap(s) orally once a day  losartan 25 mg oral tablet: 1 tab(s) orally once a day (in the evening)  oxycodone-acetaminophen 5 mg-325 mg oral tablet: 1 tab(s) orally every 6 hours MDD: 4 tabs  rosuvastatin 5 mg oral tablet: 1 tab(s) orally once a day (at bedtime)  Synthroid 88 mcg (0.088 mg) oral tablet: 1 tab(s) orally once a day (in the morning)

## 2025-04-28 NOTE — PROGRESS NOTE ADULT - SUBJECTIVE AND OBJECTIVE BOX
Patient is a 49y old  Female who presents with a chief complaint of abdominal pain (27 Apr 2025 09:43)    Events of weekend noted.  Pt denies any abdominal/pelvic pain today.  Has been having BM's.  Tolerated Cheerio's and pretzels this AM.      MEDICATIONS  (STANDING):  acetaminophen     Tablet .. 650 milliGRAM(s) Oral every 6 hours  hydrocortisone 1% Cream 1 Application(s) Topical two times a day  lactobacillus acidophilus 1 Tablet(s) Oral daily  levothyroxine 88 MICROGram(s) Oral daily  losartan 25 milliGRAM(s) Oral daily  meropenem Injectable 1000 milliGRAM(s) IV Push every 8 hours  pantoprazole  Injectable 40 milliGRAM(s) IV Push daily  potassium phosphate / sodium phosphate Powder (PHOS-NaK) 1 Packet(s) Oral once  simethicone 80 milliGRAM(s) Chew four times a day  sodium chloride 0.9%. 1000 milliLiter(s) (65 mL/Hr) IV Continuous <Continuous>    MEDICATIONS  (PRN):  acetaminophen   IVPB .. 1000 milliGRAM(s) IV Intermittent once PRN Mild Pain (1 - 3)  diphenhydrAMINE 25 milliGRAM(s) Oral every 6 hours PRN Rash and/or Itching  metoclopramide Injectable 10 milliGRAM(s) IV Push every 6 hours PRN for nausea and/or vomiting  ondansetron Injectable 4 milliGRAM(s) IV Push every 6 hours PRN for nauea and/or vomiting refractory to reglan  oxyCODONE    IR 2.5 milliGRAM(s) Oral every 4 hours PRN Moderate Pain (4 - 6)  oxyCODONE    IR 5 milliGRAM(s) Oral every 4 hours PRN Severe Pain (7 - 10)  senna 2 Tablet(s) Oral at bedtime PRN Constipation      Vital Signs Last 24 Hrs  T(C): 36.9 (28 Apr 2025 07:51), Max: 37.1 (28 Apr 2025 00:18)  T(F): 98.5 (28 Apr 2025 07:51), Max: 98.7 (28 Apr 2025 00:18)  HR: 77 (28 Apr 2025 07:51) (77 - 81)  BP: 170/75 (28 Apr 2025 07:51) (132/67 - 170/75)  BP(mean): --  RR: 17 (28 Apr 2025 07:51) (17 - 18)  SpO2: 97% (28 Apr 2025 07:51) (97% - 98%)    Parameters below as of 28 Apr 2025 07:51  Patient On (Oxygen Delivery Method): room air        PHYSICAL EXAM:    Constitutional: No acute distress, well-developed, non-toxic appearing  HEENT: good phonation, not icteric  Gastrointestinal: soft, non-tender, non-distended, no rebound or guarding  Extremities: No peripheral edema, no cyanosis or clubbing  Neurological: A/O x 3, no focal deficits, no asterixis  Psychiatric: Normal mood, normal affect  Skin: No rashes, not jaundiced    LABS:                        13.1   15.51 )-----------( 248      ( 28 Apr 2025 06:53 )             40.2     04-28    139  |  106  |  9   ----------------------------<  105[H]  3.2[L]   |  22  |  0.46[L]    Ca    8.7      28 Apr 2025 06:53  Phos  1.7     04-28  Mg     2.2     04-28    TPro  6.4  /  Alb  2.4[L]  /  TBili  0.7  /  DBili  x   /  AST  22  /  ALT  24  /  AlkPhos  76  04-28      LIVER FUNCTIONS - ( 28 Apr 2025 06:53 )  Alb: 2.4 g/dL / Pro: 6.4 gm/dL / ALK PHOS: 76 U/L / ALT: 24 U/L / AST: 22 U/L / GGT: x             RADIOLOGY & ADDITIONAL STUDIES:

## 2025-04-28 NOTE — PROGRESS NOTE ADULT - PROVIDER SPECIALTY LIST ADULT
Gastroenterology
Hospitalist
Gastroenterology
Hospitalist
Hospitalist
Surgery
Gastroenterology
Gastroenterology
Surgery
Surgery

## 2025-04-28 NOTE — PROGRESS NOTE ADULT - TIME BILLING
Review of chart; history and physical; face-to-face counseling; documentation
Time spent  coordinating the patient's care. This includes reviewing documentation pertinent to this admission, results and imaging. Further tests, medications, and procedures have been ordered as indicated. Laboratory results and the plan of care were communicated to the patient and mother. Discussed care plan with consultants including surgery. Supporting documentation was completed and added to the patient's chart.
I spend mentioned above total minutes on direct patient care on the date of this encounter . This includes reviewing prior documentation, results and imaging in addition to completing a full history and physical examination on the patient. Further tests, medications, and procedures have been ordered as indicated. Laboratory results and the plan of care were communicated to the patient and/or their family member. Supporting documentation was completed and added to the patient's chart.   .
Review of chart; history and exam; face-to-face counseling; discussion with other providers; documentation
Pt examination, review of relevant labs and radiologic studies, assured stabilization of pt, discussion with relevant services/providers for coordination of pt care and services, time is exclusive of resident and/or physician assistant teaching or supervision time

## 2025-04-28 NOTE — PROGRESS NOTE ADULT - SUBJECTIVE AND OBJECTIVE BOX
CC: Patient is a 49y old  Female who presents with a chief complaint of Abdominal Pain - Pancreatitis and Jadyn-pancreatic collection (28 Apr 2025 15:08)      Overnight: No acute events     Subjective:  Pt seen and examined at bedside. Pt is AAOx3, in no acute distress. Pt denied c/o fever, chills, chest pain, SOB, abd pain, N/V/D, extremity pain or dysfunction, hemoptysis, hematemesis, hematuria, hematochezia, headache, diplopia, vertigo, dizziness Pt tolerating diet, (+) void, (+) ambulation, (+) bowel function    ROS:  otherwise as abovementioned ROS    Vital Signs Last 24 Hrs  T(C): 36.9 (28 Apr 2025 07:51), Max: 37.1 (28 Apr 2025 00:18)  T(F): 98.5 (28 Apr 2025 07:51), Max: 98.7 (28 Apr 2025 00:18)  HR: 77 (28 Apr 2025 07:51) (77 - 81)  BP: 170/75 (28 Apr 2025 07:51) (132/67 - 170/75)  BP(mean): --  RR: 17 (28 Apr 2025 07:51) (17 - 18)  SpO2: 97% (28 Apr 2025 07:51) (97% - 98%)    Parameters below as of 28 Apr 2025 07:51  Patient On (Oxygen Delivery Method): room air        Labs:                 13.1   15.51 )-----------( 248      ( 28 Apr 2025 06:53 )             40.2     CBC Full  -  ( 28 Apr 2025 06:53 )  WBC Count : 15.51 K/uL  RBC Count : 4.40 M/uL  Hemoglobin : 13.1 g/dL  Hematocrit : 40.2 %  Platelet Count - Automated : 248 K/uL  Mean Cell Volume : 91.4 fl  Mean Cell Hemoglobin : 29.8 pg  Mean Cell Hemoglobin Concentration : 32.6 g/dL  Auto Neutrophil # : 12.21 K/uL  Auto Lymphocyte # : 1.87 K/uL  Auto Monocyte # : 1.12 K/uL  Auto Eosinophil # : 0.14 K/uL  Auto Basophil # : 0.05 K/uL  Auto Neutrophil % : 78.7 %  Auto Lymphocyte % : 12.1 %  Auto Monocyte % : 7.2 %  Auto Eosinophil % : 0.9 %  Auto Basophil % : 0.3 %    04-28    139  |  106  |  9   ----------------------------<  105[H]  3.2[L]   |  22  |  0.46[L]    Ca    8.7      28 Apr 2025 06:53  Phos  1.7     04-28  Mg     2.2     04-28    TPro  6.4  /  Alb  2.4[L]  /  TBili  0.7  /  DBili  x   /  AST  22  /  ALT  24  /  AlkPhos  76  04-28    LIVER FUNCTIONS - ( 28 Apr 2025 06:53 )  Alb: 2.4 g/dL / Pro: 6.4 gm/dL / ALK PHOS: 76 U/L / ALT: 24 U/L / AST: 22 U/L / GGT: x                 Meds:  acetaminophen     Tablet .. 650 milliGRAM(s) Oral every 6 hours  acetaminophen   IVPB .. 1000 milliGRAM(s) IV Intermittent once PRN  diphenhydrAMINE 25 milliGRAM(s) Oral every 6 hours PRN  hydrocortisone 1% Cream 1 Application(s) Topical two times a day  lactobacillus acidophilus 1 Tablet(s) Oral daily  levothyroxine 88 MICROGram(s) Oral daily  losartan 25 milliGRAM(s) Oral daily  meropenem Injectable 1000 milliGRAM(s) IV Push every 8 hours  metoclopramide Injectable 10 milliGRAM(s) IV Push every 6 hours PRN  ondansetron Injectable 4 milliGRAM(s) IV Push every 6 hours PRN  oxyCODONE    IR 2.5 milliGRAM(s) Oral every 4 hours PRN  oxyCODONE    IR 5 milliGRAM(s) Oral every 4 hours PRN  pantoprazole  Injectable 40 milliGRAM(s) IV Push daily  senna 2 Tablet(s) Oral at bedtime PRN  simethicone 80 milliGRAM(s) Chew four times a day  sodium chloride 0.9%. 1000 milliLiter(s) IV Continuous <Continuous>        Physical exam:  Pt is aaox3  Pt in no acute distress  Psych: normal affect  Resp: CTAB  CVS: S1S2(+)  ABD: +bowel sounds, soft, non distended, no rebound, no guarding, no rigidity, no skin changes to exam.   EXT: no calf tenderness or edema to exam b/l, on VTE prophylaxis

## 2025-04-28 NOTE — PROGRESS NOTE ADULT - ASSESSMENT
49-year-old female s/p pancreatic biopsy via EUS with pancreatitis presents to  ED with CC of Abd pain. Work-up in ED notable for lipase > 3000 and non-contrast CT findings of pancreatitis and healdio-pancreatic collection of blood.    #Pancreatitis  #Heladio-pancreatic collection  #Rash improved    Plan:  Pancreatic enzymes normalized  -Cont IVF mgmt  -Tolerating PO diet  -Serial abdominal exams    Remain afebrile, WBC downtrending  -Pt refusing ABX therapy at this time (received x4 doses of Meropenum)     Allergic rash - resolved   -PRN Benadryl   -Trial of topical Hydrocortisone 1%  -Hypoallergenic Linens    -Cont to monitor to evolving clinical changes      FU GI reccs (outpt follow up on discharge)  PRN analgesic and antiemetic therapy  GI ppx  / DVT ppx (SCD only)  f/u Hospitalist consultation reccs    Case and plan discussed with surgical attending

## 2025-04-28 NOTE — DISCHARGE NOTE PROVIDER - NSDCFUADDAPPT_GEN_ALL_CORE_FT
Please follow up with  PCP office to schedule follow up appointment.   Incidental CT FINDINGS:  LOWER CHEST: Partially imaged nodular and patchy groundglass opacities in   the lingula.  LIVER: Patchy steatosis. Multilobulated partially exophytic left hepatic   lobe cyst.      Please follow up with GI office to schedule follow up appointment.   Dr Skinner

## 2025-04-28 NOTE — DISCHARGE NOTE NURSING/CASE MANAGEMENT/SOCIAL WORK - PATIENT PORTAL LINK FT
You can access the FollowMyHealth Patient Portal offered by [facility] by registering at the following website: [email or web address]. By joining [facility]’s FollowMyHealth portal, you will also be able to view your health information using other applications (apps) compatible with our system.

## 2025-04-28 NOTE — PROGRESS NOTE ADULT - ASSESSMENT
49-year-old female with PMH of HTN, HLD, Hypothyroidism, cholelithiasis with later incidental pancreatic cyst that MRI suggested it can be a neuroendocrine tumor for which she underwent pancreatic biopsy via EUS  on 4/23/25 . She states in the afternoon she experienced abdominal pain and vomiting.    #Diffuse abdominal pain due to s/p pancreatic lesion biopsy with subsequent post procedure pancreatitis and likely self-limited post-biopsy bleeding, improved   Associated Leukocytosis  -No sepsis POA. Isolated Leukocytosis. SIRS/Sepsis onset 4/25 with associated tachycardia with persistent leukocytosis and onset of fevers tmax 100.8 4/26  - CT abd Peripancreatic fluid and stranding compatible with pancreatitis. Small to  moderate volume hemorrhage noted anterior to the pancreas, between the   pancreas and stomach and extending to the pericholecystic region. Small  volume hemoperitoneum also noted in the pelvis.  -Lipase 3000s->95  - Management as per surgery team   - GI attending If pt becomes hemodynamically unstable, then would likely need STAT CTA to assess for further bleeding  - diet as per surgery   - IVF , PPI   - blood cx x2 neg   - Empiric IV antibiotics per primary team- likely sterile process and does not need abx  - pain control PRN, pt advised to limit narcotics if possible  - Associated electrolyte abnormalities. COntinue to monitor electrolytes/cr and replace electrolytes accordingly. Cr improved to 0.43 from 0.88 on admission indicating likely with PRASHANTH from hypovolemia POA. Continue to monitor.     #Constipation, Bloating   -  repeat suppository for constipation   - c/w senna  - simethicone  - probiotics    #HTN  - c/w losartan    #Hypothyroidism  - c/w levothyroxine    #Rash likely due to IV abx   - s/p IV cipro and Flagyl   - c/w IV /PO pepcid for 2 more days  - benadryl prn  - antibiotics switched to meropenem    Advance Directive previously discussed.   - Full code, diagnosis, prognosis discussed  - HCP  Manolo [phone]    Ok with discharge from medicine standpoint

## 2025-04-29 LAB
CULTURE RESULTS: SIGNIFICANT CHANGE UP
CULTURE RESULTS: SIGNIFICANT CHANGE UP
SPECIMEN SOURCE: SIGNIFICANT CHANGE UP
SPECIMEN SOURCE: SIGNIFICANT CHANGE UP

## 2025-05-06 ENCOUNTER — APPOINTMENT (OUTPATIENT)
Facility: CLINIC | Age: 50
End: 2025-05-06
Payer: COMMERCIAL

## 2025-05-06 VITALS
RESPIRATION RATE: 16 BRPM | HEIGHT: 60 IN | WEIGHT: 155 LBS | OXYGEN SATURATION: 99 % | HEART RATE: 100 BPM | DIASTOLIC BLOOD PRESSURE: 97 MMHG | SYSTOLIC BLOOD PRESSURE: 158 MMHG | BODY MASS INDEX: 30.43 KG/M2

## 2025-05-06 DIAGNOSIS — K80.20 CALCULUS OF GALLBLADDER W/OUT CHOLECYSTITIS W/OUT OBSTRUCTION: ICD-10-CM

## 2025-05-06 DIAGNOSIS — K85.80 OTHER ACUTE PANCREATITIS WITHOUT NECROSIS OR INFECTION: ICD-10-CM

## 2025-05-06 DIAGNOSIS — D3A.8 OTHER BENIGN NEUROENDOCRINE TUMORS: ICD-10-CM

## 2025-05-06 DIAGNOSIS — K86.2 CYST OF PANCREAS: ICD-10-CM

## 2025-05-06 DIAGNOSIS — K85.90 ACUTE PANCREATITIS WITHOUT NECROSIS OR INFECTION, UNSPECIFIED: ICD-10-CM

## 2025-05-06 DIAGNOSIS — K22.70 BARRETT'S ESOPHAGUS W/OUT DYSPLASIA: ICD-10-CM

## 2025-05-06 PROCEDURE — 99214 OFFICE O/P EST MOD 30 MIN: CPT

## 2025-05-06 NOTE — CDI QUERY NOTE - NSCDIOTHERTXTBX_GEN_ALL_CORE_HH
Clinical documentation and/or evidence of the patient’s presentation, evaluation, and medical management, as evidenced below, may support a diagnosis that is not documented in the medical record.  In order to ensure accurate coding and accuracy of the clinical record, the documentation in this patient’s medical record requires additional clarification.      If you think the supporting documentation and/or clinical evidence supports a more specific diagnosis, please include more specific documentation of a diagnosis associated with these findings in your progress note and/or discharge summary.    	  Please clarify if the patient was found to have one of the following:    • Infectious pancreatitis secondary to fine needle aspiration.  • Non infectious pancreatitis a complication from fine needle aspiration	  • Other (specify)      Supporting documentation and/or clinical evidence:    Progress Note Adult Gastro Attending 4-27-25 @ 13:11  Pancreatitis and bleed after EUS/FNA  Rec:  My inclination is to stop abx in absence of clear evidence of infection    Progress Note Adult Hospitalist Attending 4-28-25 @ 16:17  Diffuse abdominal pain due to s/p pancreatic lesion biopsy with subsequent post procedure pancreatitis and likely self-limited post-biopsy bleeding, improved   Associated Leukocytosis  -No sepsis POA. Isolated Leukocytosis. SIRS/Sepsis onset 4/25 with associated tachycardia with persistent leukocytosis and onset of fevers tmax 100.8 4/26  - CT abd Peripancreatic fluid and stranding compatible with pancreatitis. Small to  moderate volume hemorrhage noted anterior to the pancreas, between the   pancreas and stomach and extending to the pericholecystic region. Small  volume hemoperitoneum also noted in the pelvis.  -Lipase 3000s->95

## 2025-06-30 ENCOUNTER — APPOINTMENT (OUTPATIENT)
Dept: PEDIATRIC ALLERGY IMMUNOLOGY | Facility: CLINIC | Age: 50
End: 2025-06-30
Payer: COMMERCIAL

## 2025-06-30 VITALS
RESPIRATION RATE: 18 BRPM | HEART RATE: 90 BPM | DIASTOLIC BLOOD PRESSURE: 102 MMHG | SYSTOLIC BLOOD PRESSURE: 141 MMHG | OXYGEN SATURATION: 97 %

## 2025-06-30 PROBLEM — Z91.013 SHELLFISH ALLERGY: Status: ACTIVE | Noted: 2025-06-30

## 2025-06-30 PROBLEM — Z91.018 FOOD ALLERGY: Status: ACTIVE | Noted: 2025-06-30

## 2025-06-30 PROBLEM — Z88.1 ALLERGY TO MULTIPLE ANTIBIOTICS: Status: ACTIVE | Noted: 2025-06-30

## 2025-06-30 PROBLEM — Z91.010 PEANUT ALLERGY: Status: ACTIVE | Noted: 2025-06-30

## 2025-06-30 PROBLEM — Z91.018 TREE NUT ALLERGY: Status: ACTIVE | Noted: 2025-06-30

## 2025-06-30 PROCEDURE — 99204 OFFICE O/P NEW MOD 45 MIN: CPT | Mod: 25

## 2025-06-30 PROCEDURE — 95004 PERQ TESTS W/ALRGNC XTRCS: CPT

## 2025-06-30 RX ORDER — EPINEPHRINE 0.3 MG/.3ML
0.3 INJECTION INTRAMUSCULAR
Qty: 2 | Refills: 1 | Status: ACTIVE | COMMUNITY
Start: 2025-06-30 | End: 1900-01-01

## 2025-08-07 ENCOUNTER — APPOINTMENT (OUTPATIENT)
Dept: PEDIATRIC ALLERGY IMMUNOLOGY | Facility: CLINIC | Age: 50
End: 2025-08-07